# Patient Record
Sex: FEMALE | Race: WHITE | NOT HISPANIC OR LATINO | Employment: UNEMPLOYED | URBAN - METROPOLITAN AREA
[De-identification: names, ages, dates, MRNs, and addresses within clinical notes are randomized per-mention and may not be internally consistent; named-entity substitution may affect disease eponyms.]

---

## 2017-12-05 PROBLEM — K37 APPENDICITIS: Status: ACTIVE | Noted: 2017-12-05

## 2018-11-21 PROBLEM — M48.02 SPINAL STENOSIS IN CERVICAL REGION: Status: ACTIVE | Noted: 2018-11-21

## 2020-09-21 PROBLEM — K21.9 GASTROESOPHAGEAL REFLUX DISEASE: Status: ACTIVE | Noted: 2020-09-21

## 2021-02-03 ENCOUNTER — OFFICE VISIT (OUTPATIENT)
Dept: PSYCHIATRY | Facility: CLINIC | Age: 41
End: 2021-02-03
Payer: COMMERCIAL

## 2021-02-03 VITALS
SYSTOLIC BLOOD PRESSURE: 125 MMHG | HEIGHT: 62 IN | WEIGHT: 162.94 LBS | TEMPERATURE: 98 F | RESPIRATION RATE: 17 BRPM | DIASTOLIC BLOOD PRESSURE: 74 MMHG | BODY MASS INDEX: 29.98 KG/M2 | HEART RATE: 75 BPM

## 2021-02-03 DIAGNOSIS — F41.1 GAD (GENERALIZED ANXIETY DISORDER): ICD-10-CM

## 2021-02-03 DIAGNOSIS — F99 INSOMNIA DUE TO OTHER MENTAL DISORDER: ICD-10-CM

## 2021-02-03 DIAGNOSIS — Z65.8 PSYCHOSOCIAL STRESSORS: ICD-10-CM

## 2021-02-03 DIAGNOSIS — F43.10 PTSD (POST-TRAUMATIC STRESS DISORDER): ICD-10-CM

## 2021-02-03 DIAGNOSIS — F33.1 MODERATE EPISODE OF RECURRENT MAJOR DEPRESSIVE DISORDER: Primary | ICD-10-CM

## 2021-02-03 DIAGNOSIS — F51.05 INSOMNIA DUE TO OTHER MENTAL DISORDER: ICD-10-CM

## 2021-02-03 PROCEDURE — 3008F PR BODY MASS INDEX (BMI) DOCUMENTED: ICD-10-PCS | Mod: CPTII,S$GLB,, | Performed by: STUDENT IN AN ORGANIZED HEALTH CARE EDUCATION/TRAINING PROGRAM

## 2021-02-03 PROCEDURE — 90792 PR PSYCHIATRIC DIAGNOSTIC EVALUATION W/MEDICAL SERVICES: ICD-10-PCS | Mod: S$GLB,,, | Performed by: STUDENT IN AN ORGANIZED HEALTH CARE EDUCATION/TRAINING PROGRAM

## 2021-02-03 PROCEDURE — 99999 PR PBB SHADOW E&M-EST. PATIENT-LVL III: CPT | Mod: PBBFAC,,, | Performed by: STUDENT IN AN ORGANIZED HEALTH CARE EDUCATION/TRAINING PROGRAM

## 2021-02-03 PROCEDURE — 1126F AMNT PAIN NOTED NONE PRSNT: CPT | Mod: S$GLB,,, | Performed by: STUDENT IN AN ORGANIZED HEALTH CARE EDUCATION/TRAINING PROGRAM

## 2021-02-03 PROCEDURE — 99999 PR PBB SHADOW E&M-EST. PATIENT-LVL III: ICD-10-PCS | Mod: PBBFAC,,, | Performed by: STUDENT IN AN ORGANIZED HEALTH CARE EDUCATION/TRAINING PROGRAM

## 2021-02-03 PROCEDURE — 90792 PSYCH DIAG EVAL W/MED SRVCS: CPT | Mod: S$GLB,,, | Performed by: STUDENT IN AN ORGANIZED HEALTH CARE EDUCATION/TRAINING PROGRAM

## 2021-02-03 PROCEDURE — 3008F BODY MASS INDEX DOCD: CPT | Mod: CPTII,S$GLB,, | Performed by: STUDENT IN AN ORGANIZED HEALTH CARE EDUCATION/TRAINING PROGRAM

## 2021-02-03 PROCEDURE — 1126F PR PAIN SEVERITY QUANTIFIED, NO PAIN PRESENT: ICD-10-PCS | Mod: S$GLB,,, | Performed by: STUDENT IN AN ORGANIZED HEALTH CARE EDUCATION/TRAINING PROGRAM

## 2021-02-03 RX ORDER — HYDROXYZINE PAMOATE 50 MG/1
CAPSULE ORAL
Qty: 60 CAPSULE | Refills: 2 | Status: SHIPPED | OUTPATIENT
Start: 2021-02-03 | End: 2021-04-27

## 2021-02-03 RX ORDER — BUPROPION HYDROCHLORIDE 150 MG/1
150 TABLET ORAL DAILY
Qty: 30 TABLET | Refills: 2 | Status: SHIPPED | OUTPATIENT
Start: 2021-02-03 | End: 2021-04-27

## 2021-02-03 RX ORDER — VENLAFAXINE HYDROCHLORIDE 75 MG/1
75 CAPSULE, EXTENDED RELEASE ORAL DAILY
Qty: 30 CAPSULE | Refills: 2 | Status: SHIPPED | OUTPATIENT
Start: 2021-02-03 | End: 2021-04-27

## 2021-05-06 ENCOUNTER — PATIENT MESSAGE (OUTPATIENT)
Dept: RESEARCH | Facility: HOSPITAL | Age: 41
End: 2021-05-06

## 2021-05-10 ENCOUNTER — PATIENT MESSAGE (OUTPATIENT)
Dept: RESEARCH | Facility: HOSPITAL | Age: 41
End: 2021-05-10

## 2022-06-06 ENCOUNTER — OFFICE VISIT (OUTPATIENT)
Dept: SURGERY | Facility: CLINIC | Age: 42
End: 2022-06-06
Payer: COMMERCIAL

## 2022-06-06 VITALS
BODY MASS INDEX: 31.24 KG/M2 | WEIGHT: 169.75 LBS | HEART RATE: 72 BPM | DIASTOLIC BLOOD PRESSURE: 70 MMHG | SYSTOLIC BLOOD PRESSURE: 122 MMHG | HEIGHT: 62 IN

## 2022-06-06 DIAGNOSIS — K57.32 DIVERTICULITIS OF LARGE INTESTINE WITHOUT PERFORATION OR ABSCESS WITHOUT BLEEDING: Primary | ICD-10-CM

## 2022-06-06 DIAGNOSIS — Z01.818 PRE-OP TESTING: ICD-10-CM

## 2022-06-06 PROCEDURE — 3008F BODY MASS INDEX DOCD: CPT | Mod: CPTII,S$GLB,, | Performed by: SURGERY

## 2022-06-06 PROCEDURE — 99205 PR OFFICE/OUTPT VISIT, NEW, LEVL V, 60-74 MIN: ICD-10-PCS | Mod: S$GLB,,, | Performed by: SURGERY

## 2022-06-06 PROCEDURE — 99999 PR PBB SHADOW E&M-EST. PATIENT-LVL IV: ICD-10-PCS | Mod: PBBFAC,,, | Performed by: SURGERY

## 2022-06-06 PROCEDURE — 3074F PR MOST RECENT SYSTOLIC BLOOD PRESSURE < 130 MM HG: ICD-10-PCS | Mod: CPTII,S$GLB,, | Performed by: SURGERY

## 2022-06-06 PROCEDURE — 99205 OFFICE O/P NEW HI 60 MIN: CPT | Mod: S$GLB,,, | Performed by: SURGERY

## 2022-06-06 PROCEDURE — 3078F DIAST BP <80 MM HG: CPT | Mod: CPTII,S$GLB,, | Performed by: SURGERY

## 2022-06-06 PROCEDURE — 99999 PR PBB SHADOW E&M-EST. PATIENT-LVL IV: CPT | Mod: PBBFAC,,, | Performed by: SURGERY

## 2022-06-06 PROCEDURE — 3078F PR MOST RECENT DIASTOLIC BLOOD PRESSURE < 80 MM HG: ICD-10-PCS | Mod: CPTII,S$GLB,, | Performed by: SURGERY

## 2022-06-06 PROCEDURE — 1159F PR MEDICATION LIST DOCUMENTED IN MEDICAL RECORD: ICD-10-PCS | Mod: CPTII,S$GLB,, | Performed by: SURGERY

## 2022-06-06 PROCEDURE — 1159F MED LIST DOCD IN RCRD: CPT | Mod: CPTII,S$GLB,, | Performed by: SURGERY

## 2022-06-06 PROCEDURE — 3008F PR BODY MASS INDEX (BMI) DOCUMENTED: ICD-10-PCS | Mod: CPTII,S$GLB,, | Performed by: SURGERY

## 2022-06-06 PROCEDURE — 3074F SYST BP LT 130 MM HG: CPT | Mod: CPTII,S$GLB,, | Performed by: SURGERY

## 2022-06-06 RX ORDER — METRONIDAZOLE 500 MG/1
TABLET ORAL
Qty: 3 TABLET | Refills: 0 | Status: SHIPPED | OUTPATIENT
Start: 2022-06-06 | End: 2022-06-23

## 2022-06-06 RX ORDER — NEOMYCIN SULFATE 500 MG/1
TABLET ORAL
Qty: 6 TABLET | Refills: 0 | Status: SHIPPED | OUTPATIENT
Start: 2022-06-06 | End: 2022-06-23

## 2022-06-07 RX ORDER — ACETAMINOPHEN 500 MG
1000 TABLET ORAL
Status: CANCELLED | OUTPATIENT
Start: 2022-06-07 | End: 2022-06-07

## 2022-06-07 RX ORDER — SCOLOPAMINE TRANSDERMAL SYSTEM 1 MG/1
1 PATCH, EXTENDED RELEASE TRANSDERMAL
Status: CANCELLED | OUTPATIENT
Start: 2022-06-07

## 2022-06-07 NOTE — H&P (VIEW-ONLY)
CRS Office Visit History and Physical    Referring Md:   Aaareferral Self  No address on file    SUBJECTIVE:     Chief Complaint: left lower quadrant pain    History of Present Illness:  The patient is a new patient to this practice.   Course is as follows:  Claudia Shaw is a 42 y.o. female presents with recurrent left lower quadrant pain.  She has been seen in the ED in [place] numerous times with repeat imaging, which has showed various etiologies including mesenteric adenitis and enteritis.  She has documented diverticulosis, but no diverticulitis.  However, she has been treated numerous times for diverticulitis with oral antibiotics and has improvement in her symptoms with treatment.  She reports left abdominal pain.  She also has nausea, vomiting and diarrhea.  She has had a colonoscopy within the last 5 years with removal of benign polyps.  Her mother and aunt have both had sigmoid colectomies for diverticular disease and her sister has diverticular disease as well.     Last Colonoscopy: obtaining records    Review of patient's allergies indicates:   Allergen Reactions    Dilaudid [hydromorphone (bulk)] Shortness Of Breath    Omnicef [cefdinir] Rash       Past Medical History:   Diagnosis Date    Arthritis     Asthma     Bronchitis     Diverticulitis     Diverticulitis     Frozen shoulder     left    Kidney stones     PONV (postoperative nausea and vomiting)     Spinal stenosis     Thyroid disease      Past Surgical History:   Procedure Laterality Date    ANTERIOR CERVICAL DISCECTOMY W/ FUSION N/A 11/21/2018    Procedure: DISCECTOMY, SPINE, CERVICAL, ANTERIOR APPROACH, WITH FUSION, C4-5 C5-6 W/ AUTOGRAFT FUSION/CAGE/PLATE;  Surgeon: Michele Chatterjee MD;  Location: [facility];  Service: Neurosurgery;  Laterality: N/A;    APPENDECTOMY      CHOLECYSTECTOMY      HYSTERECTOMY      TUBAL LIGATION      TUBAL LIGATION       Family History   Problem Relation Age of Onset    Diverticulitis  "Mother     Heart disease Father     No Known Problems Sister     No Known Problems Brother     No Known Problems Daughter     No Known Problems Sister     No Known Problems Sister     No Known Problems Sister     No Known Problems Brother     No Known Problems Brother     No Known Problems Daughter     No Known Problems Daughter     No Known Problems Daughter      Social History     Tobacco Use    Smoking status: Never Smoker    Smokeless tobacco: Never Used   Substance Use Topics    Alcohol use: Yes     Alcohol/week: 0.0 standard drinks     Comment: occ    Drug use: No        Review of Systems:  Review of Systems   Constitutional: Negative.    HENT: Negative.    Eyes: Negative.    Respiratory: Negative.    Cardiovascular: Negative.    Gastrointestinal:        See HPI   Genitourinary: Negative.    Musculoskeletal: Positive for back pain.   Skin: Negative.    Neurological: Negative.    Psychiatric/Behavioral: Negative.        OBJECTIVE:     Vital Signs (Most Recent)  /70 (BP Location: Left arm, Patient Position: Sitting, BP Method: Large (Automatic))   Pulse 72   Ht 5' 2" (1.575 m)   Wt 77 kg (169 lb 12.1 oz)   BMI 31.05 kg/m²     Physical Exam:  General: 42 y.o. female in no distress   Neuro: alert and oriented x 4.  Moves all extremities.     HEENT: normocephalic, atraumatic, PERRL, EOMI   Respiratory: respirations are even and unlabored  Cardiac: regular rate and rhythm  Abdomen: soft, NTND  Extremities: Warm dry and intact  Skin: no rashes      Labs:     Component Ref Range & Units 1 mo ago   (5/1/22) 4 mo ago   (1/18/22) 8 mo ago   (10/2/21) 10 mo ago   (7/24/21) 1 yr ago   (3/28/21) 1 yr ago   (3/13/21) 1 yr ago   (2/8/21)   WBC 3.90 - 12.70 K/uL 6.70  7.40  7.80  6.30  4.80  11.70  7.90    RBC 4.00 - 5.40 M/uL 4.49  4.55  4.90  4.83  4.86  4.52  4.77    Hemoglobin 12.0 - 16.0 g/dL 13.5  13.7  14.4  14.1  14.5  13.4  14.4    Hematocrit 37.0 - 48.5 % 40.2  40.5  43.3  43.0  43.0  39.9  " 42.5    MCV 82 - 98 fL 89  89  88  89  89  88  89    MCH 27.0 - 31.0 pg 30.0  30.1  29.3  29.2  29.9  29.7  30.2    MCHC 32.0 - 36.0 g/dL 33.5  33.9  33.2  32.8  33.7  33.7  33.9    RDW 11.5 - 14.5 % 13.2  13.8  13.7  13.6  13.8  13.8  13.5    Platelets 150 - 450 K/uL 257  239  268            Imaging:   Multiple CT scans personally reviewed.       ASSESSMENT/PLAN:     Claudia was seen today for diverticulitis.    Diagnoses and all orders for this visit:    Diverticulitis of large intestine without perforation or abscess without bleeding  -     Case Request Operating Room: XI ROBOTIC SIGMOID COLECTOMY, flexible sigmoidoscopy    Other orders  -     neomycin (MYCIFRADIN) 500 mg Tab; Take 2 tabs 1400 (2pm) 1500 (3 pm) 2300 (11 pm)  -     metroNIDAZOLE (FLAGYL) 500 MG tablet; 1400 (2pm) 1500  (3 pm) 2300 (11pm)        42 y.o. female with recurrent LLQ pain    - Ms. Shaw's labs and imaging personally reviewed.  The patient has recurrent LLQ pain which improves with empiric treatment for diverticulitis, although she does not have documented diverticulitis on CT.  She does have diverticulosis.  Her symptoms are nausea, vomiting, diarrhea and LLQ pain.  She has had a [facility] BSO.  Her mother and aunt have both had sigmoid colectomies for diverticular disease and her sister also has diverticulitis.  She has had a recent colonoscopy, we will obtain the records.   - We discussed the possibility that she has smoldering, recurrent diverticulitis.  However, some of her symptoms are certainly atypical for diverticular disease and are more consistent with GERD or PUD.  We discussed that she may benefit from sigmoid colectomy, however, she may not have improvement of her symptoms since she has never had image proven diverticulitis.  We also discussed that sigmoid colectomy is a major abdominal surgery that has significant risks including risk of anastomotic leak, bleeding, infection, damage to local structures including the ureter and  need for possible ostomy.  She is an acceptable surgical candidate, she does not smoke, is not diabetic and does not have significant medical comorbidities.  We discussed the potential options and the patient wishes to proceed with sigmoid colectomy.  We will obtain her endoscopy reports prior to proceeding with surgical intervention.   - consent obtained. Tentatively scheduled for 6/28.     Trinidad Becerra MD  Staff Surgeon  Colon & Rectal Surgery

## 2022-06-07 NOTE — PROGRESS NOTES
CRS Office Visit History and Physical    Referring Md:   Aaareferral Self  No address on file    SUBJECTIVE:     Chief Complaint: left lower quadrant pain    History of Present Illness:  The patient is a new patient to this practice.   Course is as follows:  Claudia Shaw is a 42 y.o. female presents with recurrent left lower quadrant pain.  She has been seen in the ED in [place] numerous times with repeat imaging, which has showed various etiologies including mesenteric adenitis and enteritis.  She has documented diverticulosis, but no diverticulitis.  However, she has been treated numerous times for diverticulitis with oral antibiotics and has improvement in her symptoms with treatment.  She reports left abdominal pain.  She also has nausea, vomiting and diarrhea.  She has had a colonoscopy within the last 5 years with removal of benign polyps.  Her mother and aunt have both had sigmoid colectomies for diverticular disease and her sister has diverticular disease as well.     Last Colonoscopy: obtaining records    Review of patient's allergies indicates:   Allergen Reactions    Dilaudid [hydromorphone (bulk)] Shortness Of Breath    Omnicef [cefdinir] Rash       Past Medical History:   Diagnosis Date    Arthritis     Asthma     Bronchitis     Diverticulitis     Diverticulitis     Frozen shoulder     left    Kidney stones     PONV (postoperative nausea and vomiting)     Spinal stenosis     Thyroid disease      Past Surgical History:   Procedure Laterality Date    ANTERIOR CERVICAL DISCECTOMY W/ FUSION N/A 11/21/2018    Procedure: DISCECTOMY, SPINE, CERVICAL, ANTERIOR APPROACH, WITH FUSION, C4-5 C5-6 W/ AUTOGRAFT FUSION/CAGE/PLATE;  Surgeon: Michele Chatterjee MD;  Location: [facility];  Service: Neurosurgery;  Laterality: N/A;    APPENDECTOMY      CHOLECYSTECTOMY      HYSTERECTOMY      TUBAL LIGATION      TUBAL LIGATION       Family History   Problem Relation Age of Onset    Diverticulitis  "Mother     Heart disease Father     No Known Problems Sister     No Known Problems Brother     No Known Problems Daughter     No Known Problems Sister     No Known Problems Sister     No Known Problems Sister     No Known Problems Brother     No Known Problems Brother     No Known Problems Daughter     No Known Problems Daughter     No Known Problems Daughter      Social History     Tobacco Use    Smoking status: Never Smoker    Smokeless tobacco: Never Used   Substance Use Topics    Alcohol use: Yes     Alcohol/week: 0.0 standard drinks     Comment: occ    Drug use: No        Review of Systems:  Review of Systems   Constitutional: Negative.    HENT: Negative.    Eyes: Negative.    Respiratory: Negative.    Cardiovascular: Negative.    Gastrointestinal:        See HPI   Genitourinary: Negative.    Musculoskeletal: Positive for back pain.   Skin: Negative.    Neurological: Negative.    Psychiatric/Behavioral: Negative.        OBJECTIVE:     Vital Signs (Most Recent)  /70 (BP Location: Left arm, Patient Position: Sitting, BP Method: Large (Automatic))   Pulse 72   Ht 5' 2" (1.575 m)   Wt 77 kg (169 lb 12.1 oz)   BMI 31.05 kg/m²     Physical Exam:  General: 42 y.o. female in no distress   Neuro: alert and oriented x 4.  Moves all extremities.     HEENT: normocephalic, atraumatic, PERRL, EOMI   Respiratory: respirations are even and unlabored  Cardiac: regular rate and rhythm  Abdomen: soft, NTND  Extremities: Warm dry and intact  Skin: no rashes      Labs:     Component Ref Range & Units 1 mo ago   (5/1/22) 4 mo ago   (1/18/22) 8 mo ago   (10/2/21) 10 mo ago   (7/24/21) 1 yr ago   (3/28/21) 1 yr ago   (3/13/21) 1 yr ago   (2/8/21)   WBC 3.90 - 12.70 K/uL 6.70  7.40  7.80  6.30  4.80  11.70  7.90    RBC 4.00 - 5.40 M/uL 4.49  4.55  4.90  4.83  4.86  4.52  4.77    Hemoglobin 12.0 - 16.0 g/dL 13.5  13.7  14.4  14.1  14.5  13.4  14.4    Hematocrit 37.0 - 48.5 % 40.2  40.5  43.3  43.0  43.0  39.9  " 42.5    MCV 82 - 98 fL 89  89  88  89  89  88  89    MCH 27.0 - 31.0 pg 30.0  30.1  29.3  29.2  29.9  29.7  30.2    MCHC 32.0 - 36.0 g/dL 33.5  33.9  33.2  32.8  33.7  33.7  33.9    RDW 11.5 - 14.5 % 13.2  13.8  13.7  13.6  13.8  13.8  13.5    Platelets 150 - 450 K/uL 257  239  268            Imaging:   Multiple CT scans personally reviewed.       ASSESSMENT/PLAN:     Claudia was seen today for diverticulitis.    Diagnoses and all orders for this visit:    Diverticulitis of large intestine without perforation or abscess without bleeding  -     Case Request Operating Room: XI ROBOTIC SIGMOID COLECTOMY, flexible sigmoidoscopy    Other orders  -     neomycin (MYCIFRADIN) 500 mg Tab; Take 2 tabs 1400 (2pm) 1500 (3 pm) 2300 (11 pm)  -     metroNIDAZOLE (FLAGYL) 500 MG tablet; 1400 (2pm) 1500  (3 pm) 2300 (11pm)        42 y.o. female with recurrent LLQ pain    - Ms. Shaw's labs and imaging personally reviewed.  The patient has recurrent LLQ pain which improves with empiric treatment for diverticulitis, although she does not have documented diverticulitis on CT.  She does have diverticulosis.  Her symptoms are nausea, vomiting, diarrhea and LLQ pain.  She has had a [facility] BSO.  Her mother and aunt have both had sigmoid colectomies for diverticular disease and her sister also has diverticulitis.  She has had a recent colonoscopy, we will obtain the records.   - We discussed the possibility that she has smoldering, recurrent diverticulitis.  However, some of her symptoms are certainly atypical for diverticular disease and are more consistent with GERD or PUD.  We discussed that she may benefit from sigmoid colectomy, however, she may not have improvement of her symptoms since she has never had image proven diverticulitis.  We also discussed that sigmoid colectomy is a major abdominal surgery that has significant risks including risk of anastomotic leak, bleeding, infection, damage to local structures including the ureter and  need for possible ostomy.  She is an acceptable surgical candidate, she does not smoke, is not diabetic and does not have significant medical comorbidities.  We discussed the potential options and the patient wishes to proceed with sigmoid colectomy.  We will obtain her endoscopy reports prior to proceeding with surgical intervention.   - consent obtained. Tentatively scheduled for 6/28.     Trinidad Becerra MD  Staff Surgeon  Colon & Rectal Surgery

## 2022-06-09 ENCOUNTER — TELEPHONE (OUTPATIENT)
Dept: SURGERY | Facility: CLINIC | Age: 42
End: 2022-06-09
Payer: COMMERCIAL

## 2022-06-09 NOTE — TELEPHONE ENCOUNTER
"Spoke with patient to inquire about location of most recent colonoscopy with Dr. Rubens Ashley. Pt stated, "it was done in [place], [place] at a small clinic that may be affliated with [facility]."  "

## 2022-06-14 ENCOUNTER — TELEPHONE (OUTPATIENT)
Dept: SURGERY | Facility: CLINIC | Age: 42
End: 2022-06-14
Payer: COMMERCIAL

## 2022-06-14 NOTE — TELEPHONE ENCOUNTER
"Spoke with patient regarding procedure length and time expected in the hospital PO. Pt stated, "she would like to have someone with her during the stay since she is from out of town." Informed that someone will be able to stay while she is admitted. Inquired about location of recent colonoscopy done by Dr. Rubens Ashley. Pt stated, "it was done on [facility] But will reach out to MD office to find out exactly." Inquired about Covid testing site near home for screening before procedure. Pt stated, "she wants to get the covid vaccine since she is going on a cruise January '23. She will bring vaccination card to pre admit appointment if she does."       ----- Message from Antwon Tucker sent at 6/14/2022  9:36 AM CDT -----  Contact: Patient  Patient requesting call back in regards to scheduled procedure. Patient stated that she would like to know how long is the stay  and can someone stay with her due to traveling.       Patient @[phone] (home)         "

## 2022-06-23 ENCOUNTER — ANESTHESIA EVENT (OUTPATIENT)
Dept: SURGERY | Facility: OTHER | Age: 42
DRG: 330 | End: 2022-06-23
Payer: COMMERCIAL

## 2022-06-23 ENCOUNTER — HOSPITAL ENCOUNTER (OUTPATIENT)
Dept: PREADMISSION TESTING | Facility: OTHER | Age: 42
Discharge: HOME | End: 2022-06-23
Attending: SURGERY
Payer: COMMERCIAL

## 2022-06-23 VITALS
DIASTOLIC BLOOD PRESSURE: 74 MMHG | HEART RATE: 77 BPM | BODY MASS INDEX: 31 KG/M2 | TEMPERATURE: 98 F | OXYGEN SATURATION: 96 % | HEIGHT: 62 IN | SYSTOLIC BLOOD PRESSURE: 120 MMHG | WEIGHT: 168.44 LBS

## 2022-06-23 LAB
ABO + RH BLD: NORMAL
BLD GP AB SCN CELLS X3 SERPL QL: NORMAL

## 2022-06-23 PROCEDURE — 36415 COLL VENOUS BLD VENIPUNCTURE: CPT | Performed by: NURSE PRACTITIONER

## 2022-06-23 PROCEDURE — 86850 RBC ANTIBODY SCREEN: CPT | Performed by: NURSE PRACTITIONER

## 2022-06-23 RX ORDER — LIDOCAINE HYDROCHLORIDE 10 MG/ML
0.5 INJECTION, SOLUTION EPIDURAL; INFILTRATION; INTRACAUDAL; PERINEURAL ONCE
Status: CANCELLED | OUTPATIENT
Start: 2022-06-23 | End: 2022-06-23

## 2022-06-23 RX ORDER — PREGABALIN 50 MG/1
50 CAPSULE ORAL
Status: CANCELLED | OUTPATIENT
Start: 2022-06-23 | End: 2022-06-23

## 2022-06-23 RX ORDER — SCOLOPAMINE TRANSDERMAL SYSTEM 1 MG/1
1 PATCH, EXTENDED RELEASE TRANSDERMAL ONCE
Status: CANCELLED | OUTPATIENT
Start: 2022-06-23 | End: 2022-06-23

## 2022-06-23 RX ORDER — METRONIDAZOLE 500 MG/1
TABLET ORAL
Qty: 3 TABLET | Refills: 0 | Status: ON HOLD | OUTPATIENT
Start: 2022-06-23 | End: 2022-07-01 | Stop reason: HOSPADM

## 2022-06-23 RX ORDER — ACETAMINOPHEN 500 MG
1000 TABLET ORAL
Status: CANCELLED | OUTPATIENT
Start: 2022-06-23 | End: 2022-06-23

## 2022-06-23 RX ORDER — NEOMYCIN SULFATE 500 MG/1
TABLET ORAL
Qty: 6 TABLET | Refills: 0 | Status: ON HOLD | OUTPATIENT
Start: 2022-06-23 | End: 2022-07-01 | Stop reason: HOSPADM

## 2022-06-23 RX ORDER — SODIUM CHLORIDE, SODIUM LACTATE, POTASSIUM CHLORIDE, CALCIUM CHLORIDE 600; 310; 30; 20 MG/100ML; MG/100ML; MG/100ML; MG/100ML
INJECTION, SOLUTION INTRAVENOUS CONTINUOUS
Status: CANCELLED | OUTPATIENT
Start: 2022-06-23

## 2022-06-23 RX ORDER — MULTIVITAMIN
1 TABLET ORAL DAILY
COMMUNITY

## 2022-06-23 NOTE — PRE ADMISSION SCREENING
Patient stated she has not been vaccinated,instructed to take a covid test before prep and day of surgery,patient verbalized understanding.message left for patient to take two antibiotics as ordered pre-op.,verbalized understanding.

## 2022-06-23 NOTE — ANESTHESIA PREPROCEDURE EVALUATION
06/23/2022  Yaritza Shaw is a 42 y.o., female.      Pre-op Assessment    I have reviewed the Patient Summary Reports.     I have reviewed the Nursing Notes. I have reviewed the NPO Status.   I have reviewed the Medications.     Review of Systems  Anesthesia Hx:  PONV History of prior surgery of interest to airway management or planning: cervical fusion. Previous anesthesia: General Recent ACF with general anesthesia.  Denies Family Hx of Anesthesia complications.  Personal Hx of Anesthesia complications, Post-Operative Nausea/Vomiting   Social:  Non-Smoker, Social Alcohol Use    Hematology/Oncology:  Hematology Normal   Oncology Normal     EENT/Dental:EENT/Dental Normal   Cardiovascular:   Exercise tolerance: good    Pulmonary:   Asthma asymptomatic    Renal/:   Chronic Renal Disease renal calculi    Hepatic/GI:   GERD, well controlled Diverticulitis   Musculoskeletal:  Spine Disorders: cervical Disc disease and Degenerative disease    Neurological:  Neurology Normal    Endocrine:  Endocrine Normal    Psych:   Psychiatric History anxiety PTSD         Physical Exam  General: Cooperative, Alert and Oriented    Airway:  Mallampati: II   Mouth Opening: Normal  Neck ROM: Extension Decreased, Left Lateral Motion Decreased    Dental:  Intact        Anesthesia Plan  Type of Anesthesia, risks & benefits discussed:    Anesthesia Type: Gen ETT  Intra-op Monitoring Plan: Standard ASA Monitors  Post Op Pain Control Plan: multimodal analgesia and peripheral nerve block  Induction:  IV  Airway Plan: Video, Post-Induction  Informed Consent: Informed consent signed with the Patient and all parties understand the risks and agree with anesthesia plan.  All questions answered.   ASA Score: 2  Anesthesia Plan Notes: Block discussed,labs ok in epic,PONV  Care w neck positioning,recent ACF    Ready For Surgery From  Anesthesia Perspective.     .

## 2022-06-23 NOTE — DISCHARGE INSTRUCTIONS
Information to Prepare you for your Surgery    PRE-ADMIT TESTING -  [phone]    [address] [facility]          Your surgery has been scheduled at [facility]. We are pleased to have the opportunity to serve you. For Further Information please call [phone].    On the day of surgery please report to the Information Desk on the 1st floor.    CONTACT YOUR PHYSICIAN'S OFFICE THE DAY PRIOR TO YOUR SURGERY TO OBTAIN YOUR ARRIVAL TIME.     The evening before surgery do not eat anything after 9 p.m. ( this includes hard candy, chewing gum and mints).  You may only have GATORADE, POWERADE AND WATER  from 9 p.m. until you leave your home.   DO NOT DRINK ANY LIQUIDS ON THE WAY TO THE HOSPITAL.      Why does your anesthesiologist allow you to drink Gatorade/Powerade before surgery?  Gatorade/Powerade helps to increase your comfort before surgery and to decrease your nausea after surgery. The carbohydrates in Gatorade/Powerade help reduce your body's stress response to surgery.  If you are a diabetic-drink only water prior to surgery.      Current Visitor policy(12/27/2021) - Patients may have 2 visitors pre and post procedure. Only 2 visitors will be allowed in the Surgical building with the patient.     SPECIAL MEDICATION INSTRUCTIONS: TAKE medications checked off by the Anesthesiologist on your Medication List.    Angiogram Patients: Take medications as instructed by your physician, including aspirin.     Surgery Patients:    If you take ASPIRIN - Your PHYSICIAN/SURGEON will need to inform you IF/OR when you need to stop taking aspirin prior to your surgery.     Do Not take any medications containing IBUPROFEN.    Do Not Wear any make-up (especially eye make-up) to surgery. Please remove any false eyelashes or eyelash extensions. If you arrive the day of surgery with makeup/eyelashes on you will be required to remove prior to surgery. (There is a risk of corneal  abrasions if eye makeup/eyelash extensions are not removed)      Leave all valuables at home.   Do Not wear any jewelry or watches, including any metal in body piercings. Jewelry must be removed prior to coming to the hospital.  There is a possibility that rings that are unable to be removed may be cut off if they are on the surgical extremity.    Please remove all hair extensions, wigs, clips and any other metal accessories/ ornaments from your hair.  These items may pose a flammable/fire risk in Surgery and must be removed.    Do not shave your surgical area at least 5 days prior to your surgery. The surgical prep will be performed at the hospital according to Infection Control regulations.    Contact Lens must be removed before surgery. Either do not wear the contact lens or bring a case and solution for storage.  Please bring a container for eyeglasses or dentures as required.  Bring any paperwork your physician has provided, such as consent forms,  history and physicals, doctor's orders, etc.   Bring comfortable clothes that are loose fitting to wear upon discharge. Take into consideration the type of surgery being performed.  Maintain your diet as advised per your physician the day prior to surgery.      Adequate rest the night before surgery is advised.   Park in the Parking lot behind the hospital or in the [facility] across the street from the parking lot. Parking is complimentary.  If you will be discharged the same day as your procedure, please arrange for a responsible adult to drive you home or to accompany you if traveling by taxi.   YOU WILL NOT BE PERMITTED TO DRIVE OR TO LEAVE THE HOSPITAL ALONE AFTER SURGERY.   If you are being discharged the same day, it is strongly recommended that you arrange for someone to remain with you for the first 24 hrs following your surgery.    The Surgeon will speak to your family/visitor after your surgery regarding the outcome of your surgery and post op  care.  The Surgeon may speak to you after your surgery, but there is a possibility you may not remember the details.  Please check with your family members regarding the conversation with the Surgeon.    We strongly recommend whoever is bringing you home be present for discharge instructions.  This will ensure a thorough understanding for your post op home care.    ALL CHILDREN MUST ALWAYS BE ACCOMPANIED BY AN ADULT.    Visitors-Refer to current Visitor policy handouts.    Thank you for your cooperation.  The Staff of [facility].            Bathing Instructions with Hibiclens    Shower the evening before and morning of your procedure with Hibiclens:  Wash your face with water and your regular face wash/soap  Apply Hibiclens directly on your skin or on a wet washcloth and wash gently. When showering: Move away from the shower stream when applying Hibiclens to avoid rinsing off too soon.  Rinse thoroughly with warm water  Do not dilute Hibiclens        Dry off as usual, do not use any deodorant, powder, body lotions, perfume, after shave or cologne.

## 2022-06-27 ENCOUNTER — TELEPHONE (OUTPATIENT)
Dept: SURGERY | Facility: CLINIC | Age: 42
End: 2022-06-27
Payer: COMMERCIAL

## 2022-06-27 NOTE — TELEPHONE ENCOUNTER
Spoke with patient regarding nausea and vomiting. Advised to continue drinking fluids. Per Dr. Becerra, advised patient to continue prep with abx at 2300 and take sips as tolerated. Pt asked about insurance coverage for procedure because no one had returned her call. Given the number to financial clearance call center. Pt verbalized understanding.

## 2022-06-27 NOTE — TELEPHONE ENCOUNTER
"Spoke with patient regarding full prep for procedure tomorrow. Pt stated, "she tends to not be able to tolerate Flagyl and becomes nauseated and throws it up. Has not been able to hold down anything since." Denies intolerance to neomycin. Advised patient to continue drinking fluids to stay hydrated. Will update Dr. Becerra of pt status.       ----- Message from Jaycee Roberts sent at 6/27/2022  3:28 PM CDT -----  Contact: @ [phone]  Pt is calling she wants to know if there is something else she can do she has taken both medicines metroNIDAZOLE (FLAGYL) 500 MG tablet and neomycin (MYCIFRADIN) 500 mg Tab she is not able to keep anything down after she takes it please call and adv @ [phone]      "

## 2022-06-27 NOTE — TELEPHONE ENCOUNTER
Spoke with patient to inform of 0500 arrival time and location for procedure tomorrow. Pt asked if we had been in contact with her insurance. Informed that Radha was contacted and left message with medical records number for information requested. Attempted to contact [employer] again this morning, no answer. Will contact again this afternoon to see if any information is needed before patient's arrival tomorrow. Pt verbalized time and location.

## 2022-06-28 ENCOUNTER — HOSPITAL ENCOUNTER (INPATIENT)
Facility: OTHER | Age: 42
LOS: 7 days | Discharge: HOME | DRG: 330 | End: 2022-07-05
Attending: SURGERY | Admitting: SURGERY
Payer: COMMERCIAL

## 2022-06-28 ENCOUNTER — ANESTHESIA (OUTPATIENT)
Dept: SURGERY | Facility: OTHER | Age: 42
DRG: 330 | End: 2022-06-28
Payer: COMMERCIAL

## 2022-06-28 DIAGNOSIS — K57.32 DIVERTICULITIS OF LARGE INTESTINE WITHOUT PERFORATION OR ABSCESS WITHOUT BLEEDING: ICD-10-CM

## 2022-06-28 DIAGNOSIS — K57.32 DIVERTICULITIS LARGE INTESTINE: ICD-10-CM

## 2022-06-28 DIAGNOSIS — K57.92 DIVERTICULITIS: ICD-10-CM

## 2022-06-28 LAB
CTP QC/QA: YES
SARS-COV-2 AG RESP QL IA.RAPID: NEGATIVE

## 2022-06-28 PROCEDURE — 25000003 PHARM REV CODE 250: Performed by: NURSE PRACTITIONER

## 2022-06-28 PROCEDURE — 71000033 HC RECOVERY, INTIAL HOUR: Performed by: SURGERY

## 2022-06-28 PROCEDURE — 63600175 PHARM REV CODE 636 W HCPCS

## 2022-06-28 PROCEDURE — P9045 ALBUMIN (HUMAN), 5%, 250 ML: HCPCS | Mod: JG | Performed by: STUDENT IN AN ORGANIZED HEALTH CARE EDUCATION/TRAINING PROGRAM

## 2022-06-28 PROCEDURE — 64488 TAP BLOCK BI INJECTION: CPT | Performed by: ANESTHESIOLOGY

## 2022-06-28 PROCEDURE — 25000003 PHARM REV CODE 250: Performed by: SURGERY

## 2022-06-28 PROCEDURE — 36000713 HC OR TIME LEV V EA ADD 15 MIN: Performed by: SURGERY

## 2022-06-28 PROCEDURE — 63600175 PHARM REV CODE 636 W HCPCS: Performed by: NURSE PRACTITIONER

## 2022-06-28 PROCEDURE — 99900035 HC TECH TIME PER 15 MIN (STAT)

## 2022-06-28 PROCEDURE — 36000712 HC OR TIME LEV V 1ST 15 MIN: Performed by: SURGERY

## 2022-06-28 PROCEDURE — 63600175 PHARM REV CODE 636 W HCPCS: Performed by: ANESTHESIOLOGY

## 2022-06-28 PROCEDURE — 25000003 PHARM REV CODE 250: Performed by: SPECIALIST

## 2022-06-28 PROCEDURE — 63600175 PHARM REV CODE 636 W HCPCS: Performed by: SURGERY

## 2022-06-28 PROCEDURE — C9290 INJ, BUPIVACAINE LIPOSOME: HCPCS | Performed by: ANESTHESIOLOGY

## 2022-06-28 PROCEDURE — 94761 N-INVAS EAR/PLS OXIMETRY MLT: CPT

## 2022-06-28 PROCEDURE — 88307 PR  SURG PATH,LEVEL V: ICD-10-PCS | Mod: 26,,, | Performed by: STUDENT IN AN ORGANIZED HEALTH CARE EDUCATION/TRAINING PROGRAM

## 2022-06-28 PROCEDURE — 37000009 HC ANESTHESIA EA ADD 15 MINS: Performed by: SURGERY

## 2022-06-28 PROCEDURE — S0030 INJECTION, METRONIDAZOLE: HCPCS | Performed by: NURSE PRACTITIONER

## 2022-06-28 PROCEDURE — 25000003 PHARM REV CODE 250: Performed by: STUDENT IN AN ORGANIZED HEALTH CARE EDUCATION/TRAINING PROGRAM

## 2022-06-28 PROCEDURE — 88307 TISSUE EXAM BY PATHOLOGIST: CPT | Mod: 26,,, | Performed by: STUDENT IN AN ORGANIZED HEALTH CARE EDUCATION/TRAINING PROGRAM

## 2022-06-28 PROCEDURE — 11000001 HC ACUTE MED/SURG PRIVATE ROOM

## 2022-06-28 PROCEDURE — 44207 L COLECTOMY/COLOPROCTOSTOMY: CPT | Mod: ,,, | Performed by: SURGERY

## 2022-06-28 PROCEDURE — 25000003 PHARM REV CODE 250: Performed by: ANESTHESIOLOGY

## 2022-06-28 PROCEDURE — 44207 PR LAP,SURG,COLECTOMY,W/ANAST: ICD-10-PCS | Mod: ,,, | Performed by: SURGERY

## 2022-06-28 PROCEDURE — 88307 TISSUE EXAM BY PATHOLOGIST: CPT | Performed by: STUDENT IN AN ORGANIZED HEALTH CARE EDUCATION/TRAINING PROGRAM

## 2022-06-28 PROCEDURE — A4216 STERILE WATER/SALINE, 10 ML: HCPCS | Performed by: STUDENT IN AN ORGANIZED HEALTH CARE EDUCATION/TRAINING PROGRAM

## 2022-06-28 PROCEDURE — 37000008 HC ANESTHESIA 1ST 15 MINUTES: Performed by: SURGERY

## 2022-06-28 PROCEDURE — 63600175 PHARM REV CODE 636 W HCPCS: Performed by: STUDENT IN AN ORGANIZED HEALTH CARE EDUCATION/TRAINING PROGRAM

## 2022-06-28 PROCEDURE — 27201423 OPTIME MED/SURG SUP & DEVICES STERILE SUPPLY: Performed by: SURGERY

## 2022-06-28 PROCEDURE — 71000039 HC RECOVERY, EACH ADD'L HOUR: Performed by: SURGERY

## 2022-06-28 RX ORDER — DIPHENHYDRAMINE HYDROCHLORIDE 50 MG/ML
INJECTION INTRAMUSCULAR; INTRAVENOUS
Status: DISCONTINUED | OUTPATIENT
Start: 2022-06-28 | End: 2022-06-28

## 2022-06-28 RX ORDER — BUPIVACAINE HYDROCHLORIDE 2.5 MG/ML
INJECTION, SOLUTION EPIDURAL; INFILTRATION; INTRACAUDAL
Status: COMPLETED | OUTPATIENT
Start: 2022-06-28 | End: 2022-06-28

## 2022-06-28 RX ORDER — SCOLOPAMINE TRANSDERMAL SYSTEM 1 MG/1
1 PATCH, EXTENDED RELEASE TRANSDERMAL ONCE
Status: COMPLETED | OUTPATIENT
Start: 2022-06-28 | End: 2022-06-28

## 2022-06-28 RX ORDER — LIDOCAINE HYDROCHLORIDE 10 MG/ML
0.5 INJECTION, SOLUTION EPIDURAL; INFILTRATION; INTRACAUDAL; PERINEURAL ONCE
Status: DISCONTINUED | OUTPATIENT
Start: 2022-06-28 | End: 2022-06-28 | Stop reason: HOSPADM

## 2022-06-28 RX ORDER — IBUPROFEN 600 MG/1
600 TABLET ORAL
Status: DISCONTINUED | OUTPATIENT
Start: 2022-06-28 | End: 2022-06-28

## 2022-06-28 RX ORDER — HEPARIN SODIUM 5000 [USP'U]/ML
5000 INJECTION, SOLUTION INTRAVENOUS; SUBCUTANEOUS ONCE
Status: DISPENSED | OUTPATIENT
Start: 2022-06-28

## 2022-06-28 RX ORDER — MIDAZOLAM HYDROCHLORIDE 1 MG/ML
INJECTION INTRAMUSCULAR; INTRAVENOUS
Status: DISCONTINUED | OUTPATIENT
Start: 2022-06-28 | End: 2022-06-28

## 2022-06-28 RX ORDER — METRONIDAZOLE 500 MG/100ML
500 INJECTION, SOLUTION INTRAVENOUS
Status: COMPLETED | OUTPATIENT
Start: 2022-06-28 | End: 2022-06-28

## 2022-06-28 RX ORDER — SODIUM CHLORIDE 0.9 % (FLUSH) 0.9 %
3 SYRINGE (ML) INJECTION
Status: DISCONTINUED | OUTPATIENT
Start: 2022-06-28 | End: 2022-07-05 | Stop reason: HOSPADM

## 2022-06-28 RX ORDER — PROPOFOL 10 MG/ML
VIAL (ML) INTRAVENOUS
Status: DISCONTINUED | OUTPATIENT
Start: 2022-06-28 | End: 2022-06-28

## 2022-06-28 RX ORDER — GABAPENTIN 300 MG/1
300 CAPSULE ORAL 3 TIMES DAILY
Status: DISCONTINUED | OUTPATIENT
Start: 2022-06-28 | End: 2022-07-05 | Stop reason: HOSPADM

## 2022-06-28 RX ORDER — ALBUTEROL SULFATE 90 UG/1
2 AEROSOL, METERED RESPIRATORY (INHALATION) 3 TIMES DAILY PRN
Status: DISCONTINUED | OUTPATIENT
Start: 2022-06-28 | End: 2022-07-05 | Stop reason: HOSPADM

## 2022-06-28 RX ORDER — ONDANSETRON 2 MG/ML
4 INJECTION INTRAMUSCULAR; INTRAVENOUS EVERY 8 HOURS PRN
Status: DISCONTINUED | OUTPATIENT
Start: 2022-06-28 | End: 2022-07-05 | Stop reason: HOSPADM

## 2022-06-28 RX ORDER — SODIUM CHLORIDE 9 MG/ML
INJECTION, SOLUTION INTRAVENOUS CONTINUOUS
Status: DISCONTINUED | OUTPATIENT
Start: 2022-06-28 | End: 2022-06-29

## 2022-06-28 RX ORDER — MUPIROCIN 20 MG/G
OINTMENT TOPICAL 2 TIMES DAILY
Status: COMPLETED | OUTPATIENT
Start: 2022-06-28 | End: 2022-07-02

## 2022-06-28 RX ORDER — VENLAFAXINE HYDROCHLORIDE 75 MG/1
75 CAPSULE, EXTENDED RELEASE ORAL DAILY
Status: DISCONTINUED | OUTPATIENT
Start: 2022-06-29 | End: 2022-07-05 | Stop reason: HOSPADM

## 2022-06-28 RX ORDER — OXYCODONE HYDROCHLORIDE 5 MG/1
5 TABLET ORAL EVERY 4 HOURS PRN
Status: DISCONTINUED | OUTPATIENT
Start: 2022-06-28 | End: 2022-07-05 | Stop reason: HOSPADM

## 2022-06-28 RX ORDER — PROCHLORPERAZINE EDISYLATE 5 MG/ML
5 INJECTION INTRAMUSCULAR; INTRAVENOUS EVERY 30 MIN PRN
Status: DISCONTINUED | OUTPATIENT
Start: 2022-06-28 | End: 2022-06-28 | Stop reason: HOSPADM

## 2022-06-28 RX ORDER — ONDANSETRON HYDROCHLORIDE 2 MG/ML
INJECTION, SOLUTION INTRAMUSCULAR; INTRAVENOUS
Status: DISCONTINUED | OUTPATIENT
Start: 2022-06-28 | End: 2022-06-28

## 2022-06-28 RX ORDER — ROCURONIUM BROMIDE 10 MG/ML
INJECTION, SOLUTION INTRAVENOUS
Status: DISCONTINUED | OUTPATIENT
Start: 2022-06-28 | End: 2022-06-28

## 2022-06-28 RX ORDER — IBUPROFEN 400 MG/1
800 TABLET ORAL EVERY 8 HOURS
Status: DISCONTINUED | OUTPATIENT
Start: 2022-06-29 | End: 2022-07-05 | Stop reason: HOSPADM

## 2022-06-28 RX ORDER — DIPHENHYDRAMINE HYDROCHLORIDE 50 MG/ML
25 INJECTION INTRAMUSCULAR; INTRAVENOUS EVERY 6 HOURS PRN
Status: DISCONTINUED | OUTPATIENT
Start: 2022-06-28 | End: 2022-06-28 | Stop reason: HOSPADM

## 2022-06-28 RX ORDER — LORAZEPAM 0.5 MG/1
0.5 TABLET ORAL ONCE
Status: COMPLETED | OUTPATIENT
Start: 2022-06-28 | End: 2022-06-28

## 2022-06-28 RX ORDER — PROPOFOL 10 MG/ML
VIAL (ML) INTRAVENOUS CONTINUOUS PRN
Status: DISCONTINUED | OUTPATIENT
Start: 2022-06-28 | End: 2022-06-28

## 2022-06-28 RX ORDER — ACETAMINOPHEN 500 MG
1000 TABLET ORAL EVERY 8 HOURS
Status: DISCONTINUED | OUTPATIENT
Start: 2022-06-29 | End: 2022-07-05 | Stop reason: HOSPADM

## 2022-06-28 RX ORDER — BUPROPION HYDROCHLORIDE 150 MG/1
150 TABLET ORAL DAILY
Status: DISCONTINUED | OUTPATIENT
Start: 2022-06-29 | End: 2022-07-05 | Stop reason: HOSPADM

## 2022-06-28 RX ORDER — PREGABALIN 50 MG/1
50 CAPSULE ORAL
Status: COMPLETED | OUTPATIENT
Start: 2022-06-28 | End: 2022-06-28

## 2022-06-28 RX ORDER — ACETAMINOPHEN 10 MG/ML
1000 INJECTION, SOLUTION INTRAVENOUS EVERY 8 HOURS
Status: COMPLETED | OUTPATIENT
Start: 2022-06-28 | End: 2022-06-29

## 2022-06-28 RX ORDER — MUPIROCIN 20 MG/G
OINTMENT TOPICAL
Status: DISPENSED | OUTPATIENT
Start: 2022-06-28

## 2022-06-28 RX ORDER — KETAMINE HCL IN 0.9 % NACL 50 MG/5 ML
SYRINGE (ML) INTRAVENOUS
Status: DISCONTINUED | OUTPATIENT
Start: 2022-06-28 | End: 2022-06-28

## 2022-06-28 RX ORDER — DEXAMETHASONE SODIUM PHOSPHATE 4 MG/ML
INJECTION, SOLUTION INTRA-ARTICULAR; INTRALESIONAL; INTRAMUSCULAR; INTRAVENOUS; SOFT TISSUE
Status: DISCONTINUED | OUTPATIENT
Start: 2022-06-28 | End: 2022-06-28

## 2022-06-28 RX ORDER — MORPHINE SULFATE 10 MG/ML
2 INJECTION INTRAMUSCULAR; INTRAVENOUS; SUBCUTANEOUS EVERY 4 HOURS PRN
Status: DISCONTINUED | OUTPATIENT
Start: 2022-06-28 | End: 2022-06-29 | Stop reason: CLARIF

## 2022-06-28 RX ORDER — LIDOCAINE HYDROCHLORIDE 20 MG/ML
INJECTION, SOLUTION EPIDURAL; INFILTRATION; INTRACAUDAL; PERINEURAL
Status: DISCONTINUED | OUTPATIENT
Start: 2022-06-28 | End: 2022-06-28

## 2022-06-28 RX ORDER — FENTANYL CITRATE 50 UG/ML
INJECTION, SOLUTION INTRAMUSCULAR; INTRAVENOUS
Status: DISCONTINUED | OUTPATIENT
Start: 2022-06-28 | End: 2022-06-28

## 2022-06-28 RX ORDER — SODIUM CHLORIDE 0.9 % (FLUSH) 0.9 %
10 SYRINGE (ML) INJECTION
Status: DISCONTINUED | OUTPATIENT
Start: 2022-06-28 | End: 2022-07-05 | Stop reason: HOSPADM

## 2022-06-28 RX ORDER — SODIUM CHLORIDE, SODIUM LACTATE, POTASSIUM CHLORIDE, CALCIUM CHLORIDE 600; 310; 30; 20 MG/100ML; MG/100ML; MG/100ML; MG/100ML
INJECTION, SOLUTION INTRAVENOUS CONTINUOUS
Status: DISCONTINUED | OUTPATIENT
Start: 2022-06-28 | End: 2022-06-29

## 2022-06-28 RX ORDER — KETOROLAC TROMETHAMINE 30 MG/ML
INJECTION, SOLUTION INTRAMUSCULAR; INTRAVENOUS
Status: DISCONTINUED | OUTPATIENT
Start: 2022-06-28 | End: 2022-06-28

## 2022-06-28 RX ORDER — LIDOCAINE HYDROCHLORIDE 10 MG/ML
1 INJECTION, SOLUTION EPIDURAL; INFILTRATION; INTRACAUDAL; PERINEURAL ONCE
Status: COMPLETED | OUTPATIENT
Start: 2022-06-28 | End: 2022-12-20

## 2022-06-28 RX ORDER — ACETAMINOPHEN 500 MG
1000 TABLET ORAL
Status: COMPLETED | OUTPATIENT
Start: 2022-06-28 | End: 2022-06-28

## 2022-06-28 RX ORDER — TIZANIDINE 2 MG/1
4 TABLET ORAL NIGHTLY
Status: DISCONTINUED | OUTPATIENT
Start: 2022-06-28 | End: 2022-07-05 | Stop reason: HOSPADM

## 2022-06-28 RX ORDER — MEPERIDINE HYDROCHLORIDE 25 MG/ML
12.5 INJECTION INTRAMUSCULAR; INTRAVENOUS; SUBCUTANEOUS ONCE AS NEEDED
Status: DISCONTINUED | OUTPATIENT
Start: 2022-06-28 | End: 2022-06-28 | Stop reason: HOSPADM

## 2022-06-28 RX ORDER — ALBUMIN HUMAN 50 G/1000ML
SOLUTION INTRAVENOUS CONTINUOUS PRN
Status: DISCONTINUED | OUTPATIENT
Start: 2022-06-28 | End: 2022-06-28

## 2022-06-28 RX ADMIN — FENTANYL CITRATE 50 MCG: 50 INJECTION, SOLUTION INTRAMUSCULAR; INTRAVENOUS at 07:06

## 2022-06-28 RX ADMIN — GABAPENTIN 300 MG: 300 CAPSULE ORAL at 08:06

## 2022-06-28 RX ADMIN — OXYCODONE 5 MG: 5 TABLET ORAL at 11:06

## 2022-06-28 RX ADMIN — IBUPROFEN 800 MG: 800 INJECTION INTRAVENOUS at 04:06

## 2022-06-28 RX ADMIN — MORPHINE SULFATE 2 MG: 10 INJECTION, SOLUTION INTRAMUSCULAR; INTRAVENOUS at 06:06

## 2022-06-28 RX ADMIN — IBUPROFEN 800 MG: 800 INJECTION INTRAVENOUS at 11:06

## 2022-06-28 RX ADMIN — FENTANYL CITRATE 50 MCG: 50 INJECTION, SOLUTION INTRAMUSCULAR; INTRAVENOUS at 10:06

## 2022-06-28 RX ADMIN — ROCURONIUM BROMIDE 20 MG: 10 SOLUTION INTRAVENOUS at 09:06

## 2022-06-28 RX ADMIN — BUPIVACAINE HYDROCHLORIDE 40 ML: 2.5 INJECTION, SOLUTION EPIDURAL; INFILTRATION; INTRACAUDAL; PERINEURAL at 06:06

## 2022-06-28 RX ADMIN — ROCURONIUM BROMIDE 40 MG: 10 SOLUTION INTRAVENOUS at 07:06

## 2022-06-28 RX ADMIN — LORAZEPAM 0.5 MG: 0.5 TABLET ORAL at 10:06

## 2022-06-28 RX ADMIN — OXYCODONE 5 MG: 5 TABLET ORAL at 03:06

## 2022-06-28 RX ADMIN — SODIUM CHLORIDE: 0.9 INJECTION, SOLUTION INTRAVENOUS at 03:06

## 2022-06-28 RX ADMIN — MUPIROCIN: 20 OINTMENT TOPICAL at 05:06

## 2022-06-28 RX ADMIN — OXYCODONE 5 MG: 5 TABLET ORAL at 08:06

## 2022-06-28 RX ADMIN — GENTAMICIN SULFATE 303 MG: 40 INJECTION, SOLUTION INTRAMUSCULAR; INTRAVENOUS at 07:06

## 2022-06-28 RX ADMIN — LIDOCAINE HYDROCHLORIDE 100 MG: 20 INJECTION, SOLUTION EPIDURAL; INFILTRATION; INTRACAUDAL; PERINEURAL at 07:06

## 2022-06-28 RX ADMIN — TIZANIDINE 4 MG: 2 TABLET ORAL at 08:06

## 2022-06-28 RX ADMIN — METRONIDAZOLE 500 MG: 500 INJECTION, SOLUTION INTRAVENOUS at 07:06

## 2022-06-28 RX ADMIN — ROCURONIUM BROMIDE 30 MG: 10 SOLUTION INTRAVENOUS at 07:06

## 2022-06-28 RX ADMIN — BUPIVACAINE 20 ML: 13.3 INJECTION, SUSPENSION, LIPOSOMAL INFILTRATION at 06:06

## 2022-06-28 RX ADMIN — ACETAMINOPHEN 1000 MG: 10 INJECTION INTRAVENOUS at 03:06

## 2022-06-28 RX ADMIN — PREGABALIN 50 MG: 50 CAPSULE ORAL at 05:06

## 2022-06-28 RX ADMIN — MUPIROCIN: 20 OINTMENT TOPICAL at 03:06

## 2022-06-28 RX ADMIN — PROPOFOL 100 MCG/KG/MIN: 10 INJECTION, EMULSION INTRAVENOUS at 07:06

## 2022-06-28 RX ADMIN — DIPHENHYDRAMINE HYDROCHLORIDE 12.5 MG: 50 INJECTION, SOLUTION INTRAMUSCULAR; INTRAVENOUS at 07:06

## 2022-06-28 RX ADMIN — KETOROLAC TROMETHAMINE 30 MG: 30 INJECTION, SOLUTION INTRAMUSCULAR; INTRAVENOUS at 10:06

## 2022-06-28 RX ADMIN — SCOLOPAMINE TRANSDERMAL SYSTEM 1 PATCH: 1 PATCH, EXTENDED RELEASE TRANSDERMAL at 05:06

## 2022-06-28 RX ADMIN — PROPOFOL 80 MCG/KG/MIN: 10 INJECTION, EMULSION INTRAVENOUS at 09:06

## 2022-06-28 RX ADMIN — PROPOFOL 250 MG: 10 INJECTION, EMULSION INTRAVENOUS at 07:06

## 2022-06-28 RX ADMIN — GABAPENTIN 300 MG: 300 CAPSULE ORAL at 04:06

## 2022-06-28 RX ADMIN — Medication 40 MG: at 07:06

## 2022-06-28 RX ADMIN — ACETAMINOPHEN 1000 MG: 10 INJECTION INTRAVENOUS at 10:06

## 2022-06-28 RX ADMIN — MIDAZOLAM HYDROCHLORIDE 2 MG: 1 INJECTION, SOLUTION INTRAMUSCULAR; INTRAVENOUS at 06:06

## 2022-06-28 RX ADMIN — SODIUM CHLORIDE, SODIUM LACTATE, POTASSIUM CHLORIDE, AND CALCIUM CHLORIDE: 600; 310; 30; 20 INJECTION, SOLUTION INTRAVENOUS at 07:06

## 2022-06-28 RX ADMIN — DEXAMETHASONE SODIUM PHOSPHATE 8 MG: 4 INJECTION, SOLUTION INTRAMUSCULAR; INTRAVENOUS at 07:06

## 2022-06-28 RX ADMIN — SUGAMMADEX 200 MG: 100 INJECTION, SOLUTION INTRAVENOUS at 10:06

## 2022-06-28 RX ADMIN — ONDANSETRON 4 MG: 2 INJECTION, SOLUTION INTRAMUSCULAR; INTRAVENOUS at 10:06

## 2022-06-28 RX ADMIN — MUPIROCIN: 20 OINTMENT TOPICAL at 08:06

## 2022-06-28 RX ADMIN — ROCURONIUM BROMIDE 15 MG: 10 SOLUTION INTRAVENOUS at 09:06

## 2022-06-28 RX ADMIN — ACETAMINOPHEN 1000 MG: 500 TABLET, FILM COATED ORAL at 05:06

## 2022-06-28 RX ADMIN — Medication 10 MG: at 08:06

## 2022-06-28 RX ADMIN — SODIUM CHLORIDE: 0.9 INJECTION, SOLUTION INTRAVENOUS at 10:06

## 2022-06-28 RX ADMIN — ROCURONIUM BROMIDE 15 MG: 10 SOLUTION INTRAVENOUS at 08:06

## 2022-06-28 RX ADMIN — SODIUM CHLORIDE 0.5 MCG/KG/HR: 9 INJECTION, SOLUTION INTRAMUSCULAR; INTRAVENOUS; SUBCUTANEOUS at 07:06

## 2022-06-28 RX ADMIN — ALBUMIN (HUMAN): 2.5 SOLUTION INTRAVENOUS at 09:06

## 2022-06-28 RX ADMIN — ALBUMIN (HUMAN): 2.5 SOLUTION INTRAVENOUS at 08:06

## 2022-06-28 NOTE — BRIEF OP NOTE
[facility] Surgery ([facility]  Brief Operative Note    SUMMARY     Surgery Date: 6/28/2022     Surgeon(s) and Role:     * Trinidad Lane MD - Primary    Assisting Surgeon: None    Pre-op Diagnosis:  Diverticulitis of large intestine without perforation or abscess without bleeding [K57.32]    Post-op Diagnosis:  Post-Op Diagnosis Codes:     * Diverticulitis of large intestine without perforation or abscess without bleeding [K57.32]    Procedure(s) (LRB):  XI ROBOTIC SIGMOID COLECTOMY, flexible sigmoidoscopy (N/A)    Anesthesia: General/Regional    Operative Findings: significant adhesions at rectosigmoid junction to vaginal cuff    Estimated Blood Loss: 150 mL    Estimated Blood Loss has been documented.         Specimens:   Specimen (24h ago, onward)             Start     Ordered    06/28/22 1036  Specimen to Pathology, Surgery General Surgery  Once        Comments: Pre-op Diagnosis: Diverticulitis of large intestine without perforation or abscess without bleeding [K57.32]Procedure(s):XI ROBOTIC SIGMOID COLECTOMY, flexible sigmoidoscopy Number of specimens: 1Name of specimens: 1. Sigmoid Colon     References:    Click here for ordering Quick Tip   Question Answer Comment   Procedure Type: General Surgery    Specimen Class: Routine/Screening    Which provider would you like to cc? TRINIDAD LANE    Release to patient Immediate        06/28/22 1036    06/28/22 1015  Specimen to Pathology, Surgery Other  Once,   Status:  Canceled        Comments: Pre-op Diagnosis: Diverticulitis of large intestine without perforation or abscess without bleeding [K57.32]Procedure(s):XI ROBOTIC SIGMOID COLECTOMY, flexible sigmoidoscopy Number of specimens: 1Name of specimens: 1. Sigmoid Colon     References:    Click here for ordering Quick Tip   Question Answer Comment   Procedure Type: Other    Specimen Class: Routine/Screening    Which provider would you like to cc? TRINIDAD LANE    Release to patient Immediate        06/28/22 1025                 [id number]

## 2022-06-28 NOTE — TRANSFER OF CARE
"Anesthesia Transfer of Care Note    Patient: Yaritza Shaw    Procedure(s) Performed: Procedure(s) (LRB):  XI ROBOTIC SIGMOID COLECTOMY, flexible sigmoidoscopy (N/A)    Patient location: PACU    Anesthesia Type: general    Transport from OR: Transported from OR on 6-10 L/min O2 by face mask with adequate spontaneous ventilation    Post pain: adequate analgesia    Post assessment: no apparent anesthetic complications and tolerated procedure well    Post vital signs: stable    Level of consciousness: sedated    Nausea/Vomiting: no nausea/vomiting    Complications: none    Transfer of care protocol was followed      Last vitals:   Visit Vitals  BP (!) 101/58 (BP Location: Right arm, Patient Position: Lying)   Pulse 67   Temp 36.2 °C (97.1 °F) (Temporal)   Resp 16   Ht 5' 2" (1.575 m)   Wt 76.4 kg (168 lb 6.9 oz)   SpO2 97%   Breastfeeding No   BMI 30.81 kg/m²     "

## 2022-06-28 NOTE — NURSING
Patient arrived to floor AAOx4. VS obtained and stable. Four eyes on skin preformed. 5 lap sites intact to abdomen with derma bond. Rivera catheter intact. No s/s of acute distress noted upon arrival. Care resumed.

## 2022-06-28 NOTE — ANESTHESIA PROCEDURE NOTES
Intubation    Date/Time: 6/28/2022 7:12 AM  Performed by: Maria R Teixeira CRNA  Authorized by: Orlin Lewis MD     Intubation:     Induction:  Intravenous    Intubated:  Postinduction    Mask Ventilation:  Easy mask    Attempts:  1    Attempted By:  CRNA    Method of Intubation:  Video laryngoscopy    Blade:  Pride 3    Laryngeal View Grade: Grade I - full view of cords      Difficult Airway Encountered?: No      Complications:  None    Airway Device:  Oral endotracheal tube    Airway Device Size:  7.5    Style/Cuff Inflation:  Cuffed (inflated to minimal occlusive pressure)    Tube secured:  22    Secured at:  The lips    Placement Verified By:  Capnometry    Complicating Factors:  None    Findings Post-Intubation:  BS equal bilateral and atraumatic/condition of teeth unchanged

## 2022-06-28 NOTE — INTERVAL H&P NOTE
The patient has been examined and the H&P has been reviewed:    I concur with the findings and no changes have occurred since H&P was written.    Surgery risks, benefits and alternative options discussed and understood by patient/family.      Trinidad Becerra MD  Staff Surgeon   Colon & Rectal Surgery

## 2022-06-28 NOTE — ANESTHESIA PROCEDURE NOTES
Bilateral TAP    Patient location during procedure: holding area   Block not for primary anesthetic.  Reason for block: at surgeon's request and post-op pain management   Post-op Pain Location: lower abdomen   Timeout: 6/28/2022 6:35 AM   End time: 6/28/2022 6:42 AM    Staffing  Authorizing Provider: Davis Bocanegra MD  Performing Provider: Davis Bocanegra MD    Preanesthetic Checklist  Completed: patient identified, IV checked, site marked, risks and benefits discussed, surgical consent, monitors and equipment checked, pre-op evaluation and timeout performed  Peripheral Block  Patient position: supine  Prep: ChloraPrep and site prepped and draped  Patient monitoring: heart rate and continuous pulse ox  Block type: TAP  Laterality: bilateral  Injection technique: single shot  Needle  Needle type: Echogenic   Needle gauge: 21 G  Needle length: 4 in  Needle localization: anatomical landmarks and ultrasound guidance   -ultrasound image captured on disc.  Assessment  Injection assessment: negative aspiration, negative parasthesia and local visualized surrounding nerve  Paresthesia pain: none  Heart rate change: no  Slow fractionated injection: yes  Pain Tolerance: comfortable throughout block and no complaints  Medications:    Medications: bupivacaine (pf) (MARCAINE) injection 0.25% - Perineural, Bilateral Transabdominus Plane   40 mL - 6/28/2022 6:41:00 AM    Additional Notes  Local visualized to spread between internal oblique and transversus abdominis  Each side injected with 20 cc 0.25% Marcaine plus 10 cc Exparel

## 2022-06-28 NOTE — ANESTHESIA POSTPROCEDURE EVALUATION
Anesthesia Post Evaluation    Patient: Yaritza Shaw    Procedure(s) Performed: Procedure(s) (LRB):  XI ROBOTIC SIGMOID COLECTOMY, flexible sigmoidoscopy (N/A)    Final Anesthesia Type: general      Patient location during evaluation: PACU  Patient participation: Yes- Able to Participate  Level of consciousness: awake and alert  Post-procedure vital signs: reviewed and stable  Pain management: adequate  Airway patency: patent    PONV status at discharge: No PONV  Anesthetic complications: no      Cardiovascular status: blood pressure returned to baseline  Respiratory status: unassisted, spontaneous ventilation and room air  Hydration status: euvolemic  Follow-up not needed.          Vitals Value Taken Time   /61 06/28/22 1244   Temp 36.4 °C (97.5 °F) 06/28/22 1115   Pulse 66 06/28/22 1256   Resp 16 06/28/22 1215   SpO2 98 % 06/28/22 1256   Vitals shown include unvalidated device data.      No case tracking events are documented in the log.      Pain/Phil Score: Pain Rating Prior to Med Admin: 4 (6/28/2022 11:46 AM)  Phil Score: 8 (6/28/2022 11:42 AM)

## 2022-06-28 NOTE — OP NOTE
Date of surgery: 06/28/2022    Operative Report  Pre-operative diagnosis: diverticulitis   Post-operative diagnosis: Same as above    Procedure:  1. Robotic-assisted sigmoid colectomy   2. Flexible sigmoidoscopy     Findings:  1. Negative leak test with patent anastomosis on flexible sigmoidoscopy    Surgeon: Trinidad Becerra MD   Assistant: Alberto Ly MD, fellow    Indication: Ms. Shaw is a 42 year old woman with a history of diverticulitis  who is scheduled to undergo robotic sigmoid colectomy. The benefits, risks, and alternatives were discussed with the patient, they were given the opportunity to ask questions and they elected to proceed with operative intervention after signing written consent.    Procedure:  After pre-operative assessment and review of informed consent, the patient was taken to the operating room and received general anesthesia. A steve catheter was then placed under strict sterile precautions. Pre-operative antibiotics were administered if indicated and the patient was placed in lithotomy position. The abdomen was prepped and draped in the usual sterile fashion and a timeout was performed according to [facility] Quality and Safety guidelines.      An incision was made at montague's point.  A veress needle was inserted into the abdominal cavity and a negative drop test was performed.  Insufflation was turned on and pneumoperitoneum was achieved.  An 8 mm trocar was placed at that site with an optiview technique.  Two additional 8 mm trocars and one 12 mm trocar were placed in the standard fashion under direct laparoscopic visualization.  A  5 mm airseal was placed in the right upper quadrant.  The patient was then placed in steep trendelenberg.  The [employer] robot was then brought onto the field and docked.  A small grasping retractor, a fenestrated bipolar and scissors were introduced into the field.      The patient was noted to have omental adhesions to the abdominal wall.  These were taken  down with electrocautery.  The omentum was then swept cephalad.  The patient was noted to have adhesions of the sigmoid colon to the left sidewall as well as the vaginal cuff.  The sigmoid was grasped and elevated.  The peritoneum was scored and the posterior mesorectal plane identified.  We elevated the superior hemorrhoidal artery and continued our dissection bluntly until we identified the left ureter.  This was swept down and out of the field of dissection.  We carried our dissection proximally to the SHERRY pedicle.  This was isolated and then ligated and divided with the vessel sealer.  We then continued our dissection proximally.  We then mobilized the lateral colon along the white line of toldt.  We then began our distal dissection.  We found significant adhesions between the vaginal cuff and the rectosigmoid colon.  We carefully  these structures until we were distal to the involved area of rectum.  We then ligated and divided the mesorectum, just distal to the peritoneal reflection.  We then divided the rectum with a sureform 45 stapler.    Proximally, the colon was healthy and noninflammed without sigifincant diverticular disease.  We identified our ligated SHERRY pedicle and divided the mesentery up to the border of the colon with the vessel sealer.  Anesthesia then gave 5 cc of indocyanine green intravenously and our colon conduit and rectal stump were both well perfused.  We then made a large colotomy at the divided mesentery.  We made a second partial thickeness colotomy a few centimeters proximally.  We then inserted a 29 mm EEA anvil into the colon and divided the colon with an additional sureform 45 blue load. We then placed the colon specimen out of the way.      EEA sizers were then passed below and the conduit reached easily to the rectal stump.  Dr. Ly then passed the EEA stapler.  The stapler was opened and the two ends mated.  We ensured that the colon conduit was properly oriented and  that the vagina was not involved in the staple line prior to closing the stapler.  We then fired the stapler.  The anastomotic rings were complete.  We then occluded the colon and filled the pelvis with saline.  Flexible sigmoidoscopy was performed. There was no active bleeding at the anastomotic staple line and the leak test was negative.  The saline was suctioned from the pelvis and hemostasis was appropriate.     A locking grasper was placed on the specimen. The robotic instruments were removed under laparoscopic visualization and the robot undocked and removed from the field.  The 12 mm trocar site was enlarged and an princess wound protector was placed in the wound.  The specimen was passed through the wound and sent for specimen.  The fascia was closed with running PDS suture.  The wounds were irrigated and closed with monocryl.  The abdomen was cleaned and dried and dressed with dermabond.     Prior to closure and after final closure instrument, needle, and sponge counts were correct.    The procedure was completed without complication and was well-tolerated. The patient was then brought to the post-anesthesia care unit in stable condition. I was present for the entire operation.    Complications: None  Estimated blood loss: 150 mL  Disposition: PACU    Trinidad Becerra MD  Staff Surgeon   Colon & Rectal Surgery

## 2022-06-29 LAB
ANION GAP SERPL CALC-SCNC: 10 MMOL/L (ref 8–16)
BASOPHILS # BLD AUTO: 0.02 K/UL (ref 0–0.2)
BASOPHILS NFR BLD: 0.1 % (ref 0–1.9)
BUN SERPL-MCNC: 16 MG/DL (ref 6–20)
CALCIUM SERPL-MCNC: 8.7 MG/DL (ref 8.7–10.5)
CHLORIDE SERPL-SCNC: 110 MMOL/L (ref 95–110)
CO2 SERPL-SCNC: 22 MMOL/L (ref 23–29)
CREAT SERPL-MCNC: 0.7 MG/DL (ref 0.5–1.4)
DIFFERENTIAL METHOD: ABNORMAL
EOSINOPHIL # BLD AUTO: 0 K/UL (ref 0–0.5)
EOSINOPHIL NFR BLD: 0 % (ref 0–8)
ERYTHROCYTE [DISTWIDTH] IN BLOOD BY AUTOMATED COUNT: 13.1 % (ref 11.5–14.5)
EST. GFR  (AFRICAN AMERICAN): >60 ML/MIN/1.73 M^2
EST. GFR  (NON AFRICAN AMERICAN): >60 ML/MIN/1.73 M^2
GLUCOSE SERPL-MCNC: 127 MG/DL (ref 70–110)
HCT VFR BLD AUTO: 35.4 % (ref 37–48.5)
HGB BLD-MCNC: 11.7 G/DL (ref 12–16)
IMM GRANULOCYTES # BLD AUTO: 0.1 K/UL (ref 0–0.04)
IMM GRANULOCYTES NFR BLD AUTO: 0.7 % (ref 0–0.5)
LYMPHOCYTES # BLD AUTO: 1.3 K/UL (ref 1–4.8)
LYMPHOCYTES NFR BLD: 9.1 % (ref 18–48)
MCH RBC QN AUTO: 31 PG (ref 27–31)
MCHC RBC AUTO-ENTMCNC: 33.1 G/DL (ref 32–36)
MCV RBC AUTO: 94 FL (ref 82–98)
MONOCYTES # BLD AUTO: 1 K/UL (ref 0.3–1)
MONOCYTES NFR BLD: 6.5 % (ref 4–15)
NEUTROPHILS # BLD AUTO: 12.2 K/UL (ref 1.8–7.7)
NEUTROPHILS NFR BLD: 83.6 % (ref 38–73)
NRBC BLD-RTO: 0 /100 WBC
PLATELET # BLD AUTO: 225 K/UL (ref 150–450)
PMV BLD AUTO: 10.3 FL (ref 9.2–12.9)
POTASSIUM SERPL-SCNC: 4.1 MMOL/L (ref 3.5–5.1)
RBC # BLD AUTO: 3.78 M/UL (ref 4–5.4)
SODIUM SERPL-SCNC: 142 MMOL/L (ref 136–145)
TROPONIN I SERPL DL<=0.01 NG/ML-MCNC: <0.006 NG/ML (ref 0–0.03)
WBC # BLD AUTO: 14.55 K/UL (ref 3.9–12.7)

## 2022-06-29 PROCEDURE — 80048 BASIC METABOLIC PNL TOTAL CA: CPT | Performed by: SURGERY

## 2022-06-29 PROCEDURE — 99900035 HC TECH TIME PER 15 MIN (STAT)

## 2022-06-29 PROCEDURE — 93010 EKG 12-LEAD: ICD-10-PCS | Mod: ,,, | Performed by: INTERNAL MEDICINE

## 2022-06-29 PROCEDURE — 63600175 PHARM REV CODE 636 W HCPCS: Performed by: SURGERY

## 2022-06-29 PROCEDURE — 27000221 HC OXYGEN, UP TO 24 HOURS

## 2022-06-29 PROCEDURE — 94761 N-INVAS EAR/PLS OXIMETRY MLT: CPT

## 2022-06-29 PROCEDURE — 93005 ELECTROCARDIOGRAM TRACING: CPT

## 2022-06-29 PROCEDURE — 93010 ELECTROCARDIOGRAM REPORT: CPT | Mod: ,,, | Performed by: INTERNAL MEDICINE

## 2022-06-29 PROCEDURE — 36415 COLL VENOUS BLD VENIPUNCTURE: CPT | Performed by: SURGERY

## 2022-06-29 PROCEDURE — 97161 PT EVAL LOW COMPLEX 20 MIN: CPT

## 2022-06-29 PROCEDURE — 84484 ASSAY OF TROPONIN QUANT: CPT | Performed by: SURGERY

## 2022-06-29 PROCEDURE — 85025 COMPLETE CBC W/AUTO DIFF WBC: CPT | Performed by: SURGERY

## 2022-06-29 PROCEDURE — 21400001 HC TELEMETRY ROOM

## 2022-06-29 PROCEDURE — 97535 SELF CARE MNGMENT TRAINING: CPT

## 2022-06-29 PROCEDURE — 25000003 PHARM REV CODE 250: Performed by: SURGERY

## 2022-06-29 PROCEDURE — 97165 OT EVAL LOW COMPLEX 30 MIN: CPT

## 2022-06-29 RX ORDER — MORPHINE SULFATE 2 MG/ML
2 INJECTION, SOLUTION INTRAMUSCULAR; INTRAVENOUS EVERY 4 HOURS PRN
Status: DISCONTINUED | OUTPATIENT
Start: 2022-06-29 | End: 2022-07-05 | Stop reason: HOSPADM

## 2022-06-29 RX ORDER — FAMOTIDINE 20 MG/1
20 TABLET, FILM COATED ORAL 2 TIMES DAILY
Status: DISCONTINUED | OUTPATIENT
Start: 2022-06-29 | End: 2022-07-05 | Stop reason: HOSPADM

## 2022-06-29 RX ADMIN — ACETAMINOPHEN 1000 MG: 500 TABLET, FILM COATED ORAL at 01:06

## 2022-06-29 RX ADMIN — GABAPENTIN 300 MG: 300 CAPSULE ORAL at 08:06

## 2022-06-29 RX ADMIN — GABAPENTIN 300 MG: 300 CAPSULE ORAL at 09:06

## 2022-06-29 RX ADMIN — OXYCODONE 5 MG: 5 TABLET ORAL at 11:06

## 2022-06-29 RX ADMIN — MORPHINE SULFATE 2 MG: 10 INJECTION, SOLUTION INTRAMUSCULAR; INTRAVENOUS at 02:06

## 2022-06-29 RX ADMIN — IBUPROFEN 800 MG: 800 INJECTION INTRAVENOUS at 06:06

## 2022-06-29 RX ADMIN — IBUPROFEN 800 MG: 400 TABLET, FILM COATED ORAL at 01:06

## 2022-06-29 RX ADMIN — VENLAFAXINE HYDROCHLORIDE 75 MG: 75 CAPSULE, EXTENDED RELEASE ORAL at 08:06

## 2022-06-29 RX ADMIN — OXYCODONE 5 MG: 5 TABLET ORAL at 08:06

## 2022-06-29 RX ADMIN — MORPHINE SULFATE 2 MG: 2 INJECTION, SOLUTION INTRAMUSCULAR; INTRAVENOUS at 01:06

## 2022-06-29 RX ADMIN — BUPROPION HYDROCHLORIDE 150 MG: 150 TABLET, FILM COATED, EXTENDED RELEASE ORAL at 08:06

## 2022-06-29 RX ADMIN — ONDANSETRON 4 MG: 2 INJECTION INTRAMUSCULAR; INTRAVENOUS at 01:06

## 2022-06-29 RX ADMIN — ACETAMINOPHEN 1000 MG: 10 INJECTION INTRAVENOUS at 06:06

## 2022-06-29 RX ADMIN — GABAPENTIN 300 MG: 300 CAPSULE ORAL at 03:06

## 2022-06-29 RX ADMIN — FAMOTIDINE 20 MG: 20 TABLET ORAL at 09:06

## 2022-06-29 RX ADMIN — ONDANSETRON 4 MG: 2 INJECTION INTRAMUSCULAR; INTRAVENOUS at 12:06

## 2022-06-29 RX ADMIN — IBUPROFEN 800 MG: 400 TABLET, FILM COATED ORAL at 09:06

## 2022-06-29 RX ADMIN — OXYCODONE 5 MG: 5 TABLET ORAL at 06:06

## 2022-06-29 RX ADMIN — MUPIROCIN: 20 OINTMENT TOPICAL at 09:06

## 2022-06-29 RX ADMIN — PROMETHAZINE HYDROCHLORIDE 12.5 MG: 25 INJECTION INTRAMUSCULAR; INTRAVENOUS at 05:06

## 2022-06-29 RX ADMIN — ACETAMINOPHEN 1000 MG: 500 TABLET, FILM COATED ORAL at 09:06

## 2022-06-29 RX ADMIN — MUPIROCIN: 20 OINTMENT TOPICAL at 08:06

## 2022-06-29 RX ADMIN — OXYCODONE 5 MG: 5 TABLET ORAL at 03:06

## 2022-06-29 RX ADMIN — TIZANIDINE 4 MG: 2 TABLET ORAL at 09:06

## 2022-06-29 NOTE — PLAN OF CARE
Problem: Physical Therapy  Goal: Physical Therapy Goal  Description: Goals to be met by: 7/29/22    Patient will perform the following to increase strength, improve mobility, and return to prior level of function:    1. Supine <> sit with SPV.  2. Sit<>stand with modified independence with least restrictive assistive device.  3. Gait x 150 feet with modified independence with least restrictive assistive device.      Outcome: Ongoing, Progressing       PT orders received. PT evaluation completed and goals/POC established. Pt tolerated evaluation well with no adverse reactions. Pt performed in-room ambulation with assistance. PT will continue to follow and progress goals as tolerated. Recommend discharge to home with HHPT and family assistance as needed.

## 2022-06-29 NOTE — NURSING
MD Irvin notified about pt discomfort. MD do not want me to remove the steve tonight. Will continue with care.

## 2022-06-29 NOTE — PT/OT/SLP EVAL
Physical Therapy Evaluation    Patient Name:  Yaritza Shaw   MRN:  [id number]    Recommendations:     Discharge Recommendations:  home health PT   Discharge Equipment Recommendations: none   Barriers to discharge: Inaccessible home, medical treatment and functional mobility impairments    Assessment:     Yaritza Shaw is a 42 y.o. female admitted with a medical diagnosis of Diverticulitis of large intestine without perforation or abscess without bleeding; she is s/p robotic sigmoid colectomy. She has a past medical history that includes but is not limited to PTSD, CAD, GERD, MDD, insomnia, cervical spinal stenosis, and appendicitis.  She presents with the following impairments/functional limitations:  weakness, impaired endurance, impaired self care skills, impaired functional mobilty, gait instability, impaired balance, decreased lower extremity function, pain.    PT orders received. PT evaluation completed and goals/POC established. Pt tolerated evaluation well with no adverse reactions. Pt performed in-room ambulation with assistance. PT will continue to follow and progress goals as tolerated. Recommend discharge to home with HHPT and family assistance as needed.     Rehab Prognosis: Good; patient would benefit from acute skilled PT services to address these deficits and reach maximum level of function.    Recent Surgery: Procedure(s) (LRB):  XI ROBOTIC SIGMOID COLECTOMY, flexible sigmoidoscopy (N/A) 1 Day Post-Op    Plan:     During this hospitalization, patient to be seen 3 x/week to address the identified rehab impairments via gait training, therapeutic activities, therapeutic exercises, neuromuscular re-education and progress toward the following goals:    · Plan of Care Expires:  07/29/22    Subjective     Chief Complaint: incisional pain  Patient/Family Comments/goals: Pt agreeable to sit up in chair after evaluation  Pain/Comfort:  · Pain Rating 1: 8/10  · Location - Side 1: Bilateral  · Location -  Orientation 1: generalized  · Location 1: abdomen  · Pain Addressed 1: Reposition, Distraction, Cessation of Activity    Patients cultural, spiritual, Mandaen conflicts given the current situation: no    Living Environment:  Pt lives with boyfriend in a H with 3 VAL (handrail available). She has access to a tub-shower combination in the home with a grab bar. Prior to admission, patients level of function was independent and ambulatory.  Equipment used at home: none.  DME owned (not currently used): none.  Upon discharge, patient will have assistance from boyfriend.    Objective:     Communicated with BILLY Nieves prior to session.  Patient found ambulatory in room/whyte with telemetry, peripheral IV  upon PT entry to room.    General Precautions: Standard, fall   Orthopedic Precautions:N/A   Braces: N/A  Respiratory Status: Room air    Exams:  · Cognition:   · Patient is oriented to self, place, time and situation.  · Pt follows approximately 100% of simple commands.    · Mood: Pleasant and cooperative.   · Safety Awareness: intact  · Musculoskeletal:  · Posture:  Forward-flexed, guarding of abdomen  · LE ROM/Strength:   · R ROM: WFL  · L ROM: WFL  · R Strength: No force applied secondary to incisional pain  · Hip flexion: 3/5  · Knee extension: 3/5  · Dorsiflexion: 3/5   · L Strength: No force applied secondary to incisional pain  · Hip flexion: 3/5  · Knee extension: 3/5  · Dorsiflexion: 3/5   · Neuromuscular:  · Sensation: Intact to light touch bilateral LEs.   · Tone/Reflexes: No impairments identified with functional mobility. No formal testing performed.   · Balance:   · Static sitting: SPV  · Dynamic sitting: SPV  · Static standing: CGA  · Dynamic standing: CGA  · Visual-vestibular: No impairments identified with functional mobility. No formal testing performed.  · Integument: Visible skin intact  · Cardiopulmonary:  · Edema: none noted     Functional Mobility:  · Transfers:     · Sit to Stand:  contact guard  assistance with hand-held assist  · Gait: combined ~30 feet with contact guard assistance. Pt with decreased speed, increased asha, and decreased bilateral step length. Decreased stability, however, no LOB noted. Pt with forward-flexed posture secondary to abdominal guarding.     Therapeutic Activities and Exercises:  Bed mobility, transfer, and gait training as described above.    PT educated patient and boyfriend re:   · PT plan of care/role of PT  · Fall and safety precautions  · Gait deviations  · Use of call light (don't get up without assistance)  Pt and boyfriend verbalized understanding     The patient is safe to transfer with RN assist  Patient encouraged to sit up in chair throughout the day to prevent deconditioning.     AM-PAC 6 CLICK MOBILITY  Total Score:18     Patient left up in chair with all lines intact, call button in reach and boyfriend present.    GOALS:   Multidisciplinary Problems     Physical Therapy Goals        Problem: Physical Therapy    Goal Priority Disciplines Outcome Goal Variances Interventions   Physical Therapy Goal     PT, PT/OT Ongoing, Progressing     Description: Goals to be met by: 7/29/22    Patient will perform the following to increase strength, improve mobility, and return to prior level of function:    1. Supine <> sit with SPV.  2. Sit<>stand with modified independence with least restrictive assistive device.  3. Gait x 150 feet with modified independence with least restrictive assistive device.                       History:     Past Medical History:   Diagnosis Date    Arthritis     Asthma     Bronchitis     Diverticulitis     Diverticulitis     Frozen shoulder     left    Kidney stones     PONV (postoperative nausea and vomiting)     Spinal stenosis     Thyroid disease        Past Surgical History:   Procedure Laterality Date    ANTERIOR CERVICAL DISCECTOMY W/ FUSION N/A 11/21/2018    Procedure: DISCECTOMY, SPINE, CERVICAL, ANTERIOR APPROACH, WITH  FUSION, C4-5 C5-6 W/ AUTOGRAFT FUSION/CAGE/PLATE;  Surgeon: Michele Chatterjee MD;  [facility];  Service: Neurosurgery;  Laterality: N/A;    APPENDECTOMY      CHOLECYSTECTOMY      HYSTERECTOMY      TUBAL LIGATION      TUBAL LIGATION         Time Tracking:     PT Received On: 06/29/22  PT Start Time: 0910     PT Stop Time: 0917  PT Total Time (min): 7 min     Billable Minutes: Evaluation 7      06/29/2022

## 2022-06-29 NOTE — PROGRESS NOTES
Surgery Inpatient Progress Note    Date: 06/29/2022    Overnight events: Out of bed this morning.  Had bowel movement.  Tolerating diet.     O:   Vitals:    06/29/22 0800   BP:    Pulse: 60   Resp:    Temp:        Physical Exam   Gen: well developed female, NAD  HEENT: normocephalic, atraumatic, PERRL, EOMI   CV: RRR, no murmurs  Resp: nonlabored, CTAB   Abd: soft, incisions well approximated without erythema or drainage  : steve catheter in place     Labs: reviewed     Lab Results   Component Value Date/Time    INR 1.1 10/02/2021 03:35 PM    INR 0.9 06/14/2017 05:21 PM        Assessment and plan:   Yaritza Shaw is a 42 y.o. female who is POD 1 s/p robotic sigmoid colectomy    - patient tolerating diet and having bowel function  - dc steve today  - OOB, ambulate  - possible DC in next 24-48 hours pending progress       Trinidad Becerra MD  Staff Surgeon   Colon & Rectal Surgery

## 2022-06-29 NOTE — PLAN OF CARE
Discussed Plan of care with patient. Verbalizes understanding. Patient is AAO x4. No falls, accidents, or traumas at this time. Cardiac Monitor: Normal Sinus Rhythm. Patient is on room air. IV site clean, dry, and intact. Lap sites intact to abdomen with derma bond. Rivera catheter removed this a.m. Patient voiding spontaneously w/o difficulty. Pain managed per MD orders. Bed is in lowest position, SR up x2, Call light within reach. Will continue to monitor.     Problem: Adult Inpatient Plan of Care  Goal: Plan of Care Review  Outcome: Ongoing, Progressing  Goal: Patient-Specific Goal (Individualized)  Outcome: Ongoing, Progressing  Goal: Absence of Hospital-Acquired Illness or Injury  Outcome: Ongoing, Progressing  Goal: Optimal Comfort and Wellbeing  Outcome: Ongoing, Progressing  Goal: Readiness for Transition of Care  Outcome: Ongoing, Progressing     Problem: Infection  Goal: Absence of Infection Signs and Symptoms  Outcome: Ongoing, Progressing     Problem: Fall Injury Risk  Goal: Absence of Fall and Fall-Related Injury  Outcome: Ongoing, Progressing

## 2022-06-29 NOTE — PLAN OF CARE
Problem: Adult Inpatient Plan of Care  Goal: Plan of Care Review  Outcome: Ongoing, Progressing  Goal: Patient-Specific Goal (Individualized)  Outcome: Ongoing, Progressing  Goal: Optimal Comfort and Wellbeing  Outcome: Ongoing, Progressing  Goal: Readiness for Transition of Care  Outcome: Ongoing, Progressing     Problem: Infection  Goal: Absence of Infection Signs and Symptoms  Outcome: Ongoing, Progressing     Problem: Fall Injury Risk  Goal: Absence of Fall and Fall-Related Injury  Outcome: Ongoing, Progressing

## 2022-06-29 NOTE — NURSING
Pt complains of chest tightness and chest feeling heavy. Pt hypotensive. MD Irvin was notified of this matter. New orders in place.Will continue with care.

## 2022-06-29 NOTE — PLAN OF CARE
Problem: Occupational Therapy  Goal: Occupational Therapy Goal  Description: Goals to be met by: 7/2/22    Patient will increase functional independence with ADLs by performing:    Pt will be Indep with use of adaptive equipment to progress to Mod I level with LB self care tasks.    Outcome: Ongoing, Progressing   Evaluation complete and goals established.  Due to pt's left UE and LE neurological impairments and abdominal surgery, pt is needing assist with LB self care.  Pt and boyfriend educated on use of adaptive equipment to increase pt's Indep with LB self care.  Training in use of reacher, sock aid, long handle shoe horn and long handle bath sponge during next tx session and pt expected to discharge from OT services.  Recommend shower chair and pt have assist/supervision when returning home after medically cleared.

## 2022-06-29 NOTE — PT/OT/SLP EVAL
Occupational Therapy   Evaluation and Treatment    Name: Yaritza Shaw  MRN: [id number]  Admitting Diagnosis:  Diverticulitis of large intestine without perforation or abscess without bleeding  Recent Surgery: Procedure(s) (LRB):  XI ROBOTIC  COLECTOMY (N/A) 1 Day Post-Op    Recommendations:     Discharge Recommendations:  (Home with assist from boyfriend and family.)  Discharge Equipment Recommendations:   (Shower chair if pt feels she needs it after being cleared to shower.)  Barriers to discharge:   (Defer to MD for medical status.)    Assessment:     Yaritza Shaw is a 42 y.o. female with a medical diagnosis of Diverticulitis of large intestine without perforation or abscess without bleeding.  She presents agreeable to OT evaluation and tx session.  Pt was previously being seen by Physical Therapy due to impaired left UE and LE function after ACDF due to C spine spinal stenosis.  Performance deficits affecting function: weakness, impaired endurance, impaired self care skills, impaired functional mobilty, gait instability, impaired balance, decreased lower extremity function, decreased ROM, pain, impaired skin.      Rehab Prognosis: Good; patient would benefit from acute skilled OT services to address these deficits and reach maximum level of function.       Plan:     Patient to be seen 2 x/week to address the above listed problems via self-care/home management  · Plan of Care Expires: 07/29/22  · Plan of Care Reviewed with: patient, significant other    Subjective     Chief Complaint: Abdominal pain and nausea  Patient/Family Comments/goals: To return to home setting when medically cleared.    Pt reports going to Physical Therapy for a long time after ACDF and that sometimes her left toes drag when walking due to left LE weakness.  Pt stating she is going to address this with PT but had to have abdominal surgery first.  Pt stating a brace was discussed but would not be addressed until after she had her  recent surgery.    Occupational Profile:  Living Environment: Lives with boyfriend in [facility], 3 VAL and handrail  Previous level of function: Indep  Roles and Routines:   Equipment Used at Home:  none  Assistance upon Discharge: Boyfriend, sister and children    Pain/Comfort:  · Pain Rating 1: 8/10  · Location - Side 1: Right  · Location 1: abdomen  · Pain Addressed 1: Pre-medicate for activity, Reposition, Distraction, Cessation of Activity, Nurse notified (Nausea)  · Pain Rating Post-Intervention 1: 8/10    Patients cultural, spiritual, Caodaism conflicts given the current situation: no    Objective:     Communicated with: nurseLizbeth, prior to session.  Patient found HOB elevated with peripheral IV, telemetry upon OT entry to room.  Pt's boyfriend present.     General Precautions: Standard, fall   Orthopedic Precautions:  ACDF about 3 years ago with pt now having neurological deficits in left UE and LE since surgery (weakness, decreased coordination and motor control, tremors/twitching)  Braces: N/A  Respiratory Status: Room air    Occupational Performance:    Bed Mobility:    · Supine<>Sit: SBA    Functional Mobility/Transfers:  · Sit to stand: SBA  · Transfers: SBA  · Functional Mobility: No LOB, slow ambulation and guarding of abdomen    Activities of Daily Living:  · Feeding: Indep  · Dressing: Min A; pt unable to reach left foot or cross left foot over right knee for LB dressing    Cognitive/Visual Perceptual:  Cognitive/Psychosocial Skills:     -       Oriented to: Person, Place, Time and Situation   -       Follows Commands/attention:Follows one-step commands  -       Communication: clear/fluent  -       Memory: No Deficits noted  -       Safety awareness/insight to disability: intact   -       Mood/Affect/Coping skills/emotional control: Cooperative and Pleasant    Physical Exam:  UE AROM: Right UE is WFL; Left UE is grossly 40% at shoulder and mildly limited elbow to hand.  UE Strength: Right side stronger  than left; Pt uses left hand as dominant for safety\No formal MMT  UE CoordinatIon: Right side is WFL for self care; Left side with decreased speed and accuracy  Sensation: Light touch grossly intact.  Edema: None noted on visible skin    AMPAC 6 Click ADL:  AMPAC Total Score: 19    Treatment & Education:  Role of OT, POC, adaptive techniques for LB self care tasks, education on adaptive equipment, discharge recs, safety    Education:    Patient left HOB and FOB elevated with all lines intact, call button in reach, nurse notified and pt's boyfriend  present    GOALS:   Multidisciplinary Problems     Occupational Therapy Goals        Problem: Occupational Therapy    Goal Priority Disciplines Outcome Interventions   Occupational Therapy Goal     OT, PT/OT Ongoing, Progressing    Description: Goals to be met by: 7/2/22    Patient will increase functional independence with ADLs by performing:    Pt will be Indep with use of adaptive equipment to progress to Mod I level with LB self care tasks.                     History:     Past Medical History:   Diagnosis Date    Arthritis     Asthma     Bronchitis     Diverticulitis     Diverticulitis     Frozen shoulder     left    Kidney stones     PONV (postoperative nausea and vomiting)     Spinal stenosis     Thyroid disease        Past Surgical History:   Procedure Laterality Date    ANTERIOR CERVICAL DISCECTOMY W/ FUSION N/A 11/21/2018    Procedure: DISCECTOMY, SPINE, CERVICAL, ANTERIOR APPROACH, WITH FUSION, C4-5 C5-6 W/ AUTOGRAFT FUSION/CAGE/PLATE;  Surgeon: Michele Chatterjee MD;  Location: [facility] OR;  Service: Neurosurgery;  Laterality: N/A;    APPENDECTOMY      CHOLECYSTECTOMY      HYSTERECTOMY      ROBOT-ASSISTED LAPAROSCOPIC COLECTOMY USING DA LUISITO XI N/A 6/28/2022    Procedure: XI ROBOTIC  COLECTOMY;  Surgeon: Trinidad Becerra MD;  Location: [facility] OR;  Service: Colon and Rectal;  Laterality: N/A;  FLEXIBLE SIGMOIDOSCOPY     TUBAL LIGATION      TUBAL  LIGATION         Time Tracking:     OT Date of Treatment: 06/29/22  OT Start Time: 1449  OT Stop Time: 1511  OT Total Time (min): 22 min    Billable Minutes:Evaluation 12  Self Care/Home Management 10    6/29/2022

## 2022-06-29 NOTE — NURSING
"Pt in bed AAOX4. RR even and unlabored. IV site accessible. Pt complaints of some pain and discomfort to steve region. Pt states "Feels like the steve is leaking and I have pressure." Steve was assessed,  No leakage noted and pt have accurate out put. Pt wants steve removed. I will notified MD of this matter. Will continue to monitor.   "

## 2022-06-30 PROCEDURE — 63600175 PHARM REV CODE 636 W HCPCS: Performed by: SURGERY

## 2022-06-30 PROCEDURE — 11000001 HC ACUTE MED/SURG PRIVATE ROOM

## 2022-06-30 PROCEDURE — 25000003 PHARM REV CODE 250: Performed by: SURGERY

## 2022-06-30 PROCEDURE — 97116 GAIT TRAINING THERAPY: CPT

## 2022-06-30 RX ORDER — PROMETHAZINE HYDROCHLORIDE 12.5 MG/1
12.5 TABLET ORAL EVERY 6 HOURS PRN
Status: DISCONTINUED | OUTPATIENT
Start: 2022-06-30 | End: 2022-07-05 | Stop reason: HOSPADM

## 2022-06-30 RX ADMIN — FAMOTIDINE 20 MG: 20 TABLET ORAL at 08:06

## 2022-06-30 RX ADMIN — PROMETHAZINE HYDROCHLORIDE 12.5 MG: 12.5 TABLET ORAL at 11:06

## 2022-06-30 RX ADMIN — TIZANIDINE 4 MG: 2 TABLET ORAL at 08:06

## 2022-06-30 RX ADMIN — ACETAMINOPHEN 1000 MG: 500 TABLET, FILM COATED ORAL at 02:06

## 2022-06-30 RX ADMIN — GABAPENTIN 300 MG: 300 CAPSULE ORAL at 08:06

## 2022-06-30 RX ADMIN — IBUPROFEN 800 MG: 400 TABLET, FILM COATED ORAL at 06:06

## 2022-06-30 RX ADMIN — VENLAFAXINE HYDROCHLORIDE 75 MG: 75 CAPSULE, EXTENDED RELEASE ORAL at 08:06

## 2022-06-30 RX ADMIN — OXYCODONE 5 MG: 5 TABLET ORAL at 08:06

## 2022-06-30 RX ADMIN — MUPIROCIN: 20 OINTMENT TOPICAL at 08:06

## 2022-06-30 RX ADMIN — PROMETHAZINE HYDROCHLORIDE 12.5 MG: 12.5 TABLET ORAL at 07:06

## 2022-06-30 RX ADMIN — ONDANSETRON 4 MG: 2 INJECTION INTRAMUSCULAR; INTRAVENOUS at 08:06

## 2022-06-30 RX ADMIN — OXYCODONE 5 MG: 5 TABLET ORAL at 06:06

## 2022-06-30 RX ADMIN — IBUPROFEN 800 MG: 400 TABLET, FILM COATED ORAL at 02:06

## 2022-06-30 RX ADMIN — OXYCODONE 5 MG: 5 TABLET ORAL at 04:06

## 2022-06-30 RX ADMIN — ONDANSETRON 4 MG: 2 INJECTION INTRAMUSCULAR; INTRAVENOUS at 05:06

## 2022-06-30 RX ADMIN — MORPHINE SULFATE 2 MG: 2 INJECTION, SOLUTION INTRAMUSCULAR; INTRAVENOUS at 09:06

## 2022-06-30 RX ADMIN — BUPROPION HYDROCHLORIDE 150 MG: 150 TABLET, FILM COATED, EXTENDED RELEASE ORAL at 08:06

## 2022-06-30 RX ADMIN — GABAPENTIN 300 MG: 300 CAPSULE ORAL at 02:06

## 2022-06-30 RX ADMIN — ACETAMINOPHEN 1000 MG: 500 TABLET, FILM COATED ORAL at 06:06

## 2022-06-30 RX ADMIN — OXYCODONE 5 MG: 5 TABLET ORAL at 11:06

## 2022-06-30 NOTE — PROGRESS NOTES
Surgery Inpatient Progress Note    Date: 06/30/2022    Overnight events: nauseated yesterday, but passing flatus and had a bowel movement this morning     O:   Vitals:    06/30/22 0700   BP:    Pulse: (!) 54   Resp:    Temp:        Physical Exam   Gen: well developed female, NAD  HEENT: normocephalic, atraumatic, PERRL, EOMI   CV: RRR, no murmurs  Resp: nonlabored, CTAB   Abd: soft, incisions well approximated without erythema or drainage   MSK: no gross deformities, no cyanosis or edema       Assessment and plan:   Yaritza Shaw is a 42 y.o. female who is POD 2 s/p robotic sigmoid colectomy for diverticulitis     - Patient passing flatus and having bowel function  - working on pain control   - OOB, ambulating   - Dispo pending resolution of nausea       Trinidad Becerra MD  Staff Surgeon   Colon & Rectal Surgery

## 2022-06-30 NOTE — PLAN OF CARE
Discussed Plan of care with patient. Verbalizes understanding. Patient is AAO x4. No falls, accidents, or traumas at this time. Cardiac Monitor discontinued per MD order. Patient is on room air. IV site clean, dry, and intact. Lap sites intact to abdomen with derma bond. Pain managed per MD orders. Bed is in lowest position, SR up x2, Call light within reach. Will continue to monitor.     Problem: Adult Inpatient Plan of Care  Goal: Plan of Care Review  Outcome: Ongoing, Progressing  Goal: Patient-Specific Goal (Individualized)  Outcome: Ongoing, Progressing  Goal: Absence of Hospital-Acquired Illness or Injury  Outcome: Ongoing, Progressing  Goal: Optimal Comfort and Wellbeing  Outcome: Ongoing, Progressing  Goal: Readiness for Transition of Care  Outcome: Ongoing, Progressing     Problem: Infection  Goal: Absence of Infection Signs and Symptoms  Outcome: Ongoing, Progressing     Problem: Fall Injury Risk  Goal: Absence of Fall and Fall-Related Injury  Outcome: Ongoing, Progressing     Problem: Pain Acute  Goal: Acceptable Pain Control and Functional Ability  Outcome: Ongoing, Progressing     Problem: Nausea and Vomiting  Goal: Fluid and Electrolyte Balance  Outcome: Ongoing, Progressing

## 2022-06-30 NOTE — PT/OT/SLP PROGRESS
Physical Therapy Treatment    Patient Name:  Yaritza Shaw   MRN:  [id number]    Recommendations:     Discharge Recommendations:  home health PT   Discharge Equipment Recommendations: none   Barriers to discharge: Increased mob and transfer needs    Assessment:     Yaritza Shaw is a 42 y.o. female admitted with a medical diagnosis of Diverticulitis of large intestine without perforation or abscess without bleeding.  She presents with the following impairments/functional limitations:  weakness, impaired endurance, impaired functional mobilty, gait instability, impaired balance, decreased ROM, impaired skin, decreased lower extremity function . Upon arrival, pt was elevated HOB on room air. Boyfriend present w/in room. Pt was SBA sup<>sit, and SBA-CGA sit<>stand w/o AD. Pt amb 75ft w/o AD, decreased step length, decreased velocity, increased fear of falling, and increased LOB on right side.    Rehab Prognosis: Good; patient would benefit from acute skilled PT services to address these deficits and reach maximum level of function.    Recent Surgery: Procedure(s) (LRB):  XI ROBOTIC  COLECTOMY (N/A) 2 Days Post-Op    Plan:     During this hospitalization, patient to be seen 3 x/week to address the identified rehab impairments via gait training, therapeutic activities, therapeutic exercises, neuromuscular re-education and progress toward the following goals:    · Plan of Care Expires:  07/29/22    Subjective     Chief Complaint: pain in abdomen w/ mvmt  Patient/Family Comments/goals: Return home  Pain/Comfort:  · Pain Rating 1: other (see comments) (did not rate)  · Location - Orientation 1: generalized  · Location 1: abdomen  · Pain Addressed 1: Pre-medicate for activity, Reposition, Cessation of Activity      Objective:     Communicated with Lizbeth RN prior to session.  Patient found HOB elevated with peripheral IV, telemetry upon PT entry to room.     General Precautions: Standard, fall   Orthopedic  Precautions:N/A   Braces: N/A  Respiratory Status: Room air     Functional Mobility:  · Bed Mobility:     · Supine to Sit: stand by assistance  · Sit to Supine: stand by assistance  · Transfers:     · Sit to Stand:  stand by assistance and contact guard assistance with no AD  · Gait: 75ft w/o AD,  · Balance: Good static and fair+ dynamic      AM-PAC 6 CLICK MOBILITY  Turning over in bed (including adjusting bedclothes, sheets and blankets)?: 4  Sitting down on and standing up from a chair with arms (e.g., wheelchair, bedside commode, etc.): 3  Moving from lying on back to sitting on the side of the bed?: 3  Moving to and from a bed to a chair (including a wheelchair)?: 3  Need to walk in hospital room?: 3  Climbing 3-5 steps with a railing?: 3  Basic Mobility Total Score: 19       Therapeutic Activities and Exercises:   importance of mob, safety awareness, gait training for increased endurance and functional mob    Patient left HOB elevated with all lines intact, call button in reach and Boyfriend present..    GOALS:   Multidisciplinary Problems     Physical Therapy Goals        Problem: Physical Therapy    Goal Priority Disciplines Outcome Goal Variances Interventions   Physical Therapy Goal     PT, PT/OT Ongoing, Progressing     Description: Goals to be met by: 7/29/22    Patient will perform the following to increase strength, improve mobility, and return to prior level of function:    1. Supine <> sit with SPV.  2. Sit<>stand with modified independence with least restrictive assistive device.  3. Gait x 150 feet with modified independence with least restrictive assistive device.                       Time Tracking:     PT Received On: 06/30/22  PT Start Time: 1210     PT Stop Time: 1229  PT Total Time (min): 19 min     Billable Minutes: Gait Training 19    Treatment Type: Treatment  PT/PTA: PT     PTA Visit Number: 0     06/30/2022

## 2022-06-30 NOTE — CARE UPDATE
06/29/22 2014   PRE-TX-O2   O2 Device (Oxygen Therapy) nasal cannula   $ Is the patient on Low Flow Oxygen? Yes   Flow (L/min) 1   SpO2 95 %   Pulse Oximetry Type Intermittent   Inhaler   $ Inhaler Charges PRN treatment not required

## 2022-06-30 NOTE — PLAN OF CARE
Problem: Occupational Therapy  Goal: Occupational Therapy Goal  Description: Goals to be met by: 7/2/22; Goals abandoned due to pt returning to being able to perform LB self care without need for AE on 6/30/22    Patient will increase functional independence with ADLs by performing:    Pt will be Indep with use of adaptive equipment to progress to Mod I level with LB self care tasks.    Outcome: Met

## 2022-06-30 NOTE — PT/OT/SLP DISCHARGE
Occupational Therapy Discharge Summary    Yaritza Shaw  MRN: [id number]   Principal Problem: Diverticulitis of large intestine without perforation or abscess without bleeding      Patient Discharged from acute Occupational Therapy on 6/30/22.  Please refer to prior OT note dated 6/29/22 for functional status.    Assessment:      Pt able to return to Mod I level with LB self care without need for training in use of adaptive equipment.      Objective:     GOALS:   Multidisciplinary Problems     Occupational Therapy Goals     Not on file          Multidisciplinary Problems (Resolved)        Problem: Occupational Therapy    Goal Priority Disciplines Outcome Interventions   Occupational Therapy Goal   (Resolved)     OT, PT/OT Met    Description: Goals to be met by: 7/2/22; Goals abandoned due to pt returning to being able to perform LB self care without need for AE on 6/30/22    Patient will increase functional independence with ADLs by performing:    Pt will be Indep with use of adaptive equipment to progress to Mod I level with LB self care tasks.                     Reasons for Discontinuation of Therapy Services  Satisfactory goal achievement.      Plan:     Patient Discharged to: Pt remains in hospital for medical treatment.    6/30/2022

## 2022-06-30 NOTE — PLAN OF CARE
06/30/22 1408   Discharge Assessment   Assessment Type Discharge Planning Assessment   Confirmed/corrected address, phone number and insurance Yes   Confirmed Demographics Correct on Facesheet   Source of Information patient   Does patient/caregiver understand observation status Yes   Communicated CAYETANO with patient/caregiver Yes;Date not available/Unable to determine   Lives With significant other   Do you expect to return to your current living situation? Yes   Prior to hospitilization cognitive status: Alert/Oriented   Current cognitive status: Alert/Oriented   Dressing/Bathing Difficulty none   Readmission within 30 days? No   Patient currently being followed by outpatient case management? No   Do you currently have service(s) that help you manage your care at home? No   Do you take prescription medications? Yes   Who is going to help you get home at discharge? Friend will transport home.   Are you on dialysis? No   Do you take coumadin? No   Discharge Plan A Home with family   Discharge Plan discussed with: Friend   Discharge Barriers Identified None

## 2022-06-30 NOTE — PLAN OF CARE
VSS and afebrile, AAOx4. Incisions CDI. Pain/Nausea controlled with PRN medications. Family at bedside. Plan of care reviewed with patient. Purposeful rounding done, call light at bed side, bed at lowest position, brakes on, non-skid socks on, will continue to monitor.     Problem: Adult Inpatient Plan of Care  Goal: Optimal Comfort and Wellbeing  6/30/2022 0558 by Choco Perez RN  Outcome: Ongoing, Progressing  6/30/2022 0557 by Choco Perez RN  Outcome: Ongoing, Progressing     Problem: Infection  Goal: Absence of Infection Signs and Symptoms  Outcome: Ongoing, Progressing     Problem: Fall Injury Risk  Goal: Absence of Fall and Fall-Related Injury  Outcome: Ongoing, Progressing     Problem: Pain Acute  Goal: Acceptable Pain Control and Functional Ability  Outcome: Ongoing, Progressing     Problem: Nausea and Vomiting  Goal: Fluid and Electrolyte Balance  Outcome: Ongoing, Progressing

## 2022-07-01 PROBLEM — K57.92 DIVERTICULITIS: Status: RESOLVED | Noted: 2022-07-01 | Resolved: 2022-07-01

## 2022-07-01 PROBLEM — K57.92 DIVERTICULITIS: Status: ACTIVE | Noted: 2022-07-01

## 2022-07-01 LAB — CRP SERPL-MCNC: 17.3 MG/L (ref 0–8.2)

## 2022-07-01 PROCEDURE — 11000001 HC ACUTE MED/SURG PRIVATE ROOM

## 2022-07-01 PROCEDURE — 25000003 PHARM REV CODE 250: Performed by: SURGERY

## 2022-07-01 PROCEDURE — 86140 C-REACTIVE PROTEIN: CPT | Performed by: SURGERY

## 2022-07-01 PROCEDURE — 36415 COLL VENOUS BLD VENIPUNCTURE: CPT | Performed by: SURGERY

## 2022-07-01 PROCEDURE — 99900035 HC TECH TIME PER 15 MIN (STAT)

## 2022-07-01 PROCEDURE — 94761 N-INVAS EAR/PLS OXIMETRY MLT: CPT

## 2022-07-01 PROCEDURE — 63600175 PHARM REV CODE 636 W HCPCS: Performed by: SURGERY

## 2022-07-01 PROCEDURE — 97116 GAIT TRAINING THERAPY: CPT | Mod: CQ

## 2022-07-01 RX ORDER — PROMETHAZINE HYDROCHLORIDE 12.5 MG/1
12.5 TABLET ORAL EVERY 6 HOURS PRN
Qty: 15 TABLET | Refills: 0 | Status: SHIPPED | OUTPATIENT
Start: 2022-07-01

## 2022-07-01 RX ORDER — OXYCODONE HYDROCHLORIDE 5 MG/1
5 TABLET ORAL EVERY 4 HOURS PRN
Qty: 40 TABLET | Refills: 0 | Status: SHIPPED | OUTPATIENT
Start: 2022-07-01

## 2022-07-01 RX ORDER — OXYCODONE HYDROCHLORIDE 5 MG/1
10 TABLET ORAL EVERY 4 HOURS PRN
Status: DISCONTINUED | OUTPATIENT
Start: 2022-07-01 | End: 2022-07-05 | Stop reason: HOSPADM

## 2022-07-01 RX ADMIN — GABAPENTIN 300 MG: 300 CAPSULE ORAL at 09:07

## 2022-07-01 RX ADMIN — MORPHINE SULFATE 2 MG: 2 INJECTION, SOLUTION INTRAMUSCULAR; INTRAVENOUS at 04:07

## 2022-07-01 RX ADMIN — MORPHINE SULFATE 2 MG: 2 INJECTION, SOLUTION INTRAMUSCULAR; INTRAVENOUS at 02:07

## 2022-07-01 RX ADMIN — IBUPROFEN 800 MG: 400 TABLET, FILM COATED ORAL at 09:07

## 2022-07-01 RX ADMIN — MUPIROCIN: 20 OINTMENT TOPICAL at 08:07

## 2022-07-01 RX ADMIN — OXYCODONE 5 MG: 5 TABLET ORAL at 01:07

## 2022-07-01 RX ADMIN — BUPROPION HYDROCHLORIDE 150 MG: 150 TABLET, FILM COATED, EXTENDED RELEASE ORAL at 09:07

## 2022-07-01 RX ADMIN — ACETAMINOPHEN 1000 MG: 500 TABLET, FILM COATED ORAL at 09:07

## 2022-07-01 RX ADMIN — MUPIROCIN: 20 OINTMENT TOPICAL at 09:07

## 2022-07-01 RX ADMIN — PROMETHAZINE HYDROCHLORIDE 12.5 MG: 12.5 TABLET ORAL at 04:07

## 2022-07-01 RX ADMIN — PROMETHAZINE HYDROCHLORIDE 12.5 MG: 12.5 TABLET ORAL at 02:07

## 2022-07-01 RX ADMIN — ACETAMINOPHEN 1000 MG: 500 TABLET, FILM COATED ORAL at 01:07

## 2022-07-01 RX ADMIN — ONDANSETRON 4 MG: 2 INJECTION INTRAMUSCULAR; INTRAVENOUS at 06:07

## 2022-07-01 RX ADMIN — FAMOTIDINE 20 MG: 20 TABLET ORAL at 08:07

## 2022-07-01 RX ADMIN — IBUPROFEN 800 MG: 400 TABLET, FILM COATED ORAL at 01:07

## 2022-07-01 RX ADMIN — ONDANSETRON 4 MG: 2 INJECTION INTRAMUSCULAR; INTRAVENOUS at 09:07

## 2022-07-01 RX ADMIN — GABAPENTIN 300 MG: 300 CAPSULE ORAL at 02:07

## 2022-07-01 RX ADMIN — ONDANSETRON 4 MG: 2 INJECTION INTRAMUSCULAR; INTRAVENOUS at 01:07

## 2022-07-01 RX ADMIN — MORPHINE SULFATE 2 MG: 2 INJECTION, SOLUTION INTRAMUSCULAR; INTRAVENOUS at 08:07

## 2022-07-01 RX ADMIN — OXYCODONE 10 MG: 5 TABLET ORAL at 06:07

## 2022-07-01 RX ADMIN — TIZANIDINE 4 MG: 2 TABLET ORAL at 08:07

## 2022-07-01 RX ADMIN — VENLAFAXINE HYDROCHLORIDE 75 MG: 75 CAPSULE, EXTENDED RELEASE ORAL at 09:07

## 2022-07-01 RX ADMIN — OXYCODONE 10 MG: 5 TABLET ORAL at 11:07

## 2022-07-01 RX ADMIN — FAMOTIDINE 20 MG: 20 TABLET ORAL at 09:07

## 2022-07-01 RX ADMIN — PROMETHAZINE HYDROCHLORIDE 12.5 MG: 12.5 TABLET ORAL at 11:07

## 2022-07-01 RX ADMIN — GABAPENTIN 300 MG: 300 CAPSULE ORAL at 08:07

## 2022-07-01 RX ADMIN — ACETAMINOPHEN 1000 MG: 500 TABLET, FILM COATED ORAL at 05:07

## 2022-07-01 RX ADMIN — OXYCODONE 10 MG: 5 TABLET ORAL at 09:07

## 2022-07-01 RX ADMIN — IBUPROFEN 800 MG: 400 TABLET, FILM COATED ORAL at 05:07

## 2022-07-01 NOTE — PROGRESS NOTES
Surgery Inpatient Progress Note    Date: 07/01/2022    Overnight events: Passing flatus and having bowel function.  Still having nausea and vomiting, improved with phenergan.  Patient walked with PT yesterday.     O:   Vitals:    07/01/22 0732   BP: (!) 99/57   Pulse: 74   Resp: 16   Temp: 98 °F (36.7 °C)       Physical Exam   Gen: well developed female, NAD  HEENT: normocephalic, atraumatic, PERRL, EOMI   CV: RRR, no murmurs  Resp: nonlabored, CTAB   Abd: soft, incisions well approximated without erythema or drainage   MSK: no gross deformities     Assessment and plan:   Yaritza Shaw is a 42 y.o. female who is POD 3 s/p robotic sigmoid colectomy     - Patient passing flatus and having bowel function, persistent nausea and vomiting.   - ambulate, OOB  - pain control   - possible DC over weekend.       Trinidad Becerra MD  Staff Surgeon   Colon & Rectal Surgery

## 2022-07-01 NOTE — PT/OT/SLP PROGRESS
Physical Therapy Treatment    Patient Name:  Yaritza Shaw   MRN:  [id number]    Recommendations:     Discharge Recommendations:  home health PT   Discharge Equipment Recommendations: none   Barriers to discharge: None    Assessment:     Yaritza Shaw is a 42 y.o. female admitted with a medical diagnosis of Diverticulitis of large intestine without perforation or abscess without bleeding.  She presents with the following impairments/functional limitations:  weakness, impaired endurance, impaired functional mobilty, pain, impaired skin , pt with improved mobility today, inc amb distance, NO LOB or SOB noted. Pt reported she will not have any trouble going up a few steps into home. Pt inst'd to walk over the weekend w/ 's assistance.    Rehab Prognosis: Good; patient would benefit from acute skilled PT services to address these deficits and reach maximum level of function.    Recent Surgery: Procedure(s) (LRB):  XI ROBOTIC  COLECTOMY (N/A) 3 Days Post-Op    Plan:     During this hospitalization, patient to be seen 3 x/week to address the identified rehab impairments via gait training, therapeutic activities, therapeutic exercises, neuromuscular re-education and progress toward the following goals:    · Plan of Care Expires:  07/29/22    Subjective     Chief Complaint: nausea  Patient/Family Comments/goals: pt agreeable to session, boyfriend present as well.  Pain/Comfort:  · Pain Rating 1: 7/10 (at rest)  · Location - Side 1: Right  · Location - Orientation 1: generalized  · Location 1: abdomen  · Pain Addressed 1: Reposition, Distraction, Nurse notified  · Pain Rating Post-Intervention 1: 8/10 (with amb)      Objective:     Communicated with nurse prior to session.  Patient found HOB elevated with peripheral IV, oxygen upon PT entry to room.     General Precautions: Standard, fall   Orthopedic Precautions:N/A   Braces: N/A  Respiratory Status: Nasal cannula, flow 2 L/min     Functional Mobility:  · Bed  Mobility:     · Supine to Sit: stand by assistance and with HOB elevated and pt using bedrail (reports she is going to sleep in a recliner she just purchased)  · Transfers:     · Sit to Stand:  supervision and stand by assistance with no AD  · Gait: amb'd ~150' w/ SBA/Superv.      AM-PAC 6 CLICK MOBILITY  Turning over in bed (including adjusting bedclothes, sheets and blankets)?: 4  Sitting down on and standing up from a chair with arms (e.g., wheelchair, bedside commode, etc.): 3  Moving from lying on back to sitting on the side of the bed?: 3  Moving to and from a bed to a chair (including a wheelchair)?: 3  Need to walk in hospital room?: 3  Climbing 3-5 steps with a railing?: 3  Basic Mobility Total Score: 19       Therapeutic Activities and Exercises:   inst'd in LE AP's, LAQ's in chair    Patient left up in chair with all lines intact, call button in reach, nurse notified and  present..    GOALS:   Multidisciplinary Problems     Physical Therapy Goals        Problem: Physical Therapy    Goal Priority Disciplines Outcome Goal Variances Interventions   Physical Therapy Goal     PT, PT/OT Ongoing, Progressing     Description: Goals to be met by: 7/29/22    Patient will perform the following to increase strength, improve mobility, and return to prior level of function:    1. Supine <> sit with SPV.  2. Sit<>stand with modified independence with least restrictive assistive device.  3. Gait x 150 feet with modified independence with least restrictive assistive device.                       Time Tracking:     PT Received On: 07/01/22  PT Start Time: 0829     PT Stop Time: 0841  PT Total Time (min): 12 min     Billable Minutes: Gait Training 12    Treatment Type: Treatment  PT/PTA: PTA     PTA Visit Number: 1 07/01/2022

## 2022-07-01 NOTE — PLAN OF CARE
Problem: Adult Inpatient Plan of Care  Goal: Plan of Care Review  Outcome: Ongoing, Progressing  Goal: Patient-Specific Goal (Individualized)  Outcome: Ongoing, Progressing  Goal: Absence of Hospital-Acquired Illness or Injury  Outcome: Ongoing, Progressing  Goal: Optimal Comfort and Wellbeing  Outcome: Ongoing, Progressing  Goal: Readiness for Transition of Care  Outcome: Ongoing, Progressing     Problem: Infection  Goal: Absence of Infection Signs and Symptoms  Outcome: Ongoing, Progressing     Problem: Fall Injury Risk  Goal: Absence of Fall and Fall-Related Injury  Outcome: Ongoing, Progressing     Problem: Pain Acute  Goal: Acceptable Pain Control and Functional Ability  Outcome: Ongoing, Progressing     Problem: Nausea and Vomiting  Goal: Fluid and Electrolyte Balance  Outcome: Ongoing, Progressing

## 2022-07-01 NOTE — DISCHARGE INSTRUCTIONS
Take tylenol and ibuprofen for pain.  Take pain medication for breakthrough pain only.    If no bowel movement in 24-48 hours, take capful of miralax daily until bowel movement   Okay to shower, do not soak in tub or swim for 2 weeks  No heavy lifting for 4 weeks  Call [phone] fever > 101, redness or swelling from incisions, persistent nausea/vomiting.    Follow up in 2 weeks

## 2022-07-01 NOTE — HOSPITAL COURSE
Ms. Shaw presented for robotic sigmoid colectomy for diverticular disease.  Post-operatively she was admitted to the floor.  She had return of bowel function, was able to tolerate a diet, and ambulate.  Her steve was removed and she was able to void.  She was deemed appropriate for discharge.

## 2022-07-02 LAB — CRP SERPL-MCNC: 21.7 MG/L (ref 0–8.2)

## 2022-07-02 PROCEDURE — 63600175 PHARM REV CODE 636 W HCPCS: Performed by: STUDENT IN AN ORGANIZED HEALTH CARE EDUCATION/TRAINING PROGRAM

## 2022-07-02 PROCEDURE — 25000003 PHARM REV CODE 250: Performed by: SURGERY

## 2022-07-02 PROCEDURE — 94761 N-INVAS EAR/PLS OXIMETRY MLT: CPT

## 2022-07-02 PROCEDURE — 94799 UNLISTED PULMONARY SVC/PX: CPT

## 2022-07-02 PROCEDURE — 99232 SBSQ HOSP IP/OBS MODERATE 35: CPT | Mod: ,,, | Performed by: STUDENT IN AN ORGANIZED HEALTH CARE EDUCATION/TRAINING PROGRAM

## 2022-07-02 PROCEDURE — 99232 PR SUBSEQUENT HOSPITAL CARE,LEVL II: ICD-10-PCS | Mod: ,,, | Performed by: STUDENT IN AN ORGANIZED HEALTH CARE EDUCATION/TRAINING PROGRAM

## 2022-07-02 PROCEDURE — 11000001 HC ACUTE MED/SURG PRIVATE ROOM

## 2022-07-02 PROCEDURE — 86140 C-REACTIVE PROTEIN: CPT | Performed by: SURGERY

## 2022-07-02 PROCEDURE — 63600175 PHARM REV CODE 636 W HCPCS: Performed by: SURGERY

## 2022-07-02 PROCEDURE — 36415 COLL VENOUS BLD VENIPUNCTURE: CPT | Performed by: SURGERY

## 2022-07-02 PROCEDURE — 27000221 HC OXYGEN, UP TO 24 HOURS

## 2022-07-02 PROCEDURE — 99900035 HC TECH TIME PER 15 MIN (STAT)

## 2022-07-02 RX ORDER — SODIUM CHLORIDE, SODIUM LACTATE, POTASSIUM CHLORIDE, CALCIUM CHLORIDE 600; 310; 30; 20 MG/100ML; MG/100ML; MG/100ML; MG/100ML
INJECTION, SOLUTION INTRAVENOUS CONTINUOUS
Status: DISCONTINUED | OUTPATIENT
Start: 2022-07-02 | End: 2022-07-04

## 2022-07-02 RX ADMIN — ACETAMINOPHEN 1000 MG: 500 TABLET, FILM COATED ORAL at 02:07

## 2022-07-02 RX ADMIN — BUPROPION HYDROCHLORIDE 150 MG: 150 TABLET, FILM COATED, EXTENDED RELEASE ORAL at 08:07

## 2022-07-02 RX ADMIN — OXYCODONE 10 MG: 5 TABLET ORAL at 08:07

## 2022-07-02 RX ADMIN — SODIUM CHLORIDE, SODIUM LACTATE, POTASSIUM CHLORIDE, AND CALCIUM CHLORIDE: .6; .31; .03; .02 INJECTION, SOLUTION INTRAVENOUS at 07:07

## 2022-07-02 RX ADMIN — MORPHINE SULFATE 2 MG: 2 INJECTION, SOLUTION INTRAMUSCULAR; INTRAVENOUS at 06:07

## 2022-07-02 RX ADMIN — IBUPROFEN 800 MG: 400 TABLET, FILM COATED ORAL at 05:07

## 2022-07-02 RX ADMIN — OXYCODONE 10 MG: 5 TABLET ORAL at 04:07

## 2022-07-02 RX ADMIN — PROMETHAZINE HYDROCHLORIDE 12.5 MG: 12.5 TABLET ORAL at 02:07

## 2022-07-02 RX ADMIN — PROMETHAZINE HYDROCHLORIDE 12.5 MG: 12.5 TABLET ORAL at 08:07

## 2022-07-02 RX ADMIN — MORPHINE SULFATE 2 MG: 2 INJECTION, SOLUTION INTRAMUSCULAR; INTRAVENOUS at 11:07

## 2022-07-02 RX ADMIN — SODIUM CHLORIDE, SODIUM LACTATE, POTASSIUM CHLORIDE, AND CALCIUM CHLORIDE 500 ML: .6; .31; .03; .02 INJECTION, SOLUTION INTRAVENOUS at 11:07

## 2022-07-02 RX ADMIN — IBUPROFEN 800 MG: 400 TABLET, FILM COATED ORAL at 09:07

## 2022-07-02 RX ADMIN — VENLAFAXINE HYDROCHLORIDE 75 MG: 75 CAPSULE, EXTENDED RELEASE ORAL at 08:07

## 2022-07-02 RX ADMIN — MORPHINE SULFATE 2 MG: 2 INJECTION, SOLUTION INTRAMUSCULAR; INTRAVENOUS at 04:07

## 2022-07-02 RX ADMIN — FAMOTIDINE 20 MG: 20 TABLET ORAL at 08:07

## 2022-07-02 RX ADMIN — MUPIROCIN: 20 OINTMENT TOPICAL at 08:07

## 2022-07-02 RX ADMIN — GABAPENTIN 300 MG: 300 CAPSULE ORAL at 08:07

## 2022-07-02 RX ADMIN — GABAPENTIN 300 MG: 300 CAPSULE ORAL at 02:07

## 2022-07-02 RX ADMIN — SODIUM CHLORIDE, SODIUM LACTATE, POTASSIUM CHLORIDE, AND CALCIUM CHLORIDE: .6; .31; .03; .02 INJECTION, SOLUTION INTRAVENOUS at 12:07

## 2022-07-02 RX ADMIN — IBUPROFEN 800 MG: 400 TABLET, FILM COATED ORAL at 02:07

## 2022-07-02 RX ADMIN — TIZANIDINE 4 MG: 2 TABLET ORAL at 09:07

## 2022-07-02 RX ADMIN — ONDANSETRON 4 MG: 2 INJECTION INTRAMUSCULAR; INTRAVENOUS at 04:07

## 2022-07-02 RX ADMIN — ACETAMINOPHEN 1000 MG: 500 TABLET, FILM COATED ORAL at 05:07

## 2022-07-02 NOTE — PLAN OF CARE
Pt resting comfortably in bed with family at bedside. Vital signs have been stable today, pain better controlled but pt still presenting with ongoing nausea and vomiting. MD aware. Ambulating independently in room. Nonskid socks on, fall precautions in place.   Problem: Adult Inpatient Plan of Care  Goal: Plan of Care Review  Outcome: Ongoing, Progressing  Goal: Patient-Specific Goal (Individualized)  Outcome: Ongoing, Progressing  Goal: Absence of Hospital-Acquired Illness or Injury  Outcome: Ongoing, Progressing  Goal: Optimal Comfort and Wellbeing  Outcome: Ongoing, Progressing  Goal: Readiness for Transition of Care  Outcome: Ongoing, Progressing     Problem: Pain Acute  Goal: Acceptable Pain Control and Functional Ability  Outcome: Ongoing, Progressing     Problem: Nausea and Vomiting  Goal: Fluid and Electrolyte Balance  Outcome: Ongoing, Progressing

## 2022-07-02 NOTE — PROGRESS NOTES
"Surgery Inpatient Progress Note    Date: 07/02/2022    Subjective:   4 Days Post-Op  Nauseous and vomiting, tried breakfast and vomited it back up.  Has been able to tolerate minimal PO.  Reports distention, passing minimal flatus.    Objective:   BP (!) 103/56 (BP Location: Right arm, Patient Position: Sitting)   Pulse 69   Temp 97.6 °F (36.4 °C) (Oral)   Resp 18   Ht 5' 2" (1.575 m)   Wt 75.8 kg (167 lb 3.2 oz)   SpO2 (!) 94%   Breastfeeding No   BMI 30.58 kg/m²     Physical Exam  Constitutional:       General: She is not in acute distress.  HENT:      Mouth/Throat:      Mouth: Mucous membranes are dry.   Cardiovascular:      Rate and Rhythm: Normal rate.   Pulmonary:      Effort: Pulmonary effort is normal. No respiratory distress.   Abdominal:      General: There is distension.      Palpations: Abdomen is soft.      Tenderness: There is no abdominal tenderness.      Comments: Incisions intact, no drainage or erythema   Skin:     General: Skin is warm and dry.   Neurological:      Mental Status: She is alert.   Psychiatric:         Mood and Affect: Mood normal.         Behavior: Behavior normal.          Assessment and Plan:   Yaritza Shaw is a 42 y.o. female with a history of diverticulitis now POD 4 s/p robotic sigmoid colectomy, ileus persists.     - Ileus persists, continues to have nausea vomiting  - Encourage OOB/ ambulation  - 500mL crystalloid bolus now, then gentle IV fluids at 50mL/hr until tolerating PO  - Antiemetics PRN  - Strict I/O  - Pain control as needed  - Discharge pending resolution of ileus    Janey Nova MD  Endocrine Surgery & General Surgery    "

## 2022-07-02 NOTE — PLAN OF CARE
Patient on RA with sats as documented. Pt on prn therapy and doesn't require respiratory tx at this time.   Pt doing IS @  1500 L. Will cont to encourage deep breathing and coughing. No apparent respiratory distress noted and will cont to monitor

## 2022-07-02 NOTE — PLAN OF CARE
Plan of care reviewed with patient. Pt AAOx4. 2L/NC applied overnight to increase Spo2 to 96%. Pt remains free from fall, injury, and skin breakdown. Positions self independently. Pt pain controlled with current pain regimen. PRN medication given for intermittent nausea and effective. Tolerating ordered diet. Voiding spontaneously.  Purposeful hourly rounding done. Safety maintained.     Problem: Adult Inpatient Plan of Care  Goal: Plan of Care Review  Outcome: Ongoing, Progressing     Problem: Pain Acute  Goal: Acceptable Pain Control and Functional Ability  Outcome: Ongoing, Progressing     Problem: Nausea and Vomiting  Goal: Fluid and Electrolyte Balance  Outcome: Ongoing, Progressing     Problem: Fall Injury Risk  Goal: Absence of Fall and Fall-Related Injury  Outcome: Ongoing, Progressing     Problem: Infection  Goal: Absence of Infection Signs and Symptoms  Outcome: Ongoing, Progressing

## 2022-07-03 LAB — CRP SERPL-MCNC: 21.4 MG/L (ref 0–8.2)

## 2022-07-03 PROCEDURE — 63600175 PHARM REV CODE 636 W HCPCS: Performed by: SURGERY

## 2022-07-03 PROCEDURE — 27000221 HC OXYGEN, UP TO 24 HOURS

## 2022-07-03 PROCEDURE — 11000001 HC ACUTE MED/SURG PRIVATE ROOM

## 2022-07-03 PROCEDURE — 99232 PR SUBSEQUENT HOSPITAL CARE,LEVL II: ICD-10-PCS | Mod: GT,,, | Performed by: STUDENT IN AN ORGANIZED HEALTH CARE EDUCATION/TRAINING PROGRAM

## 2022-07-03 PROCEDURE — 25000003 PHARM REV CODE 250: Performed by: SURGERY

## 2022-07-03 PROCEDURE — 94761 N-INVAS EAR/PLS OXIMETRY MLT: CPT

## 2022-07-03 PROCEDURE — 99900035 HC TECH TIME PER 15 MIN (STAT)

## 2022-07-03 PROCEDURE — 86140 C-REACTIVE PROTEIN: CPT | Performed by: SURGERY

## 2022-07-03 PROCEDURE — 99232 SBSQ HOSP IP/OBS MODERATE 35: CPT | Mod: GT,,, | Performed by: STUDENT IN AN ORGANIZED HEALTH CARE EDUCATION/TRAINING PROGRAM

## 2022-07-03 PROCEDURE — 25000242 PHARM REV CODE 250 ALT 637 W/ HCPCS: Performed by: SURGERY

## 2022-07-03 PROCEDURE — 36415 COLL VENOUS BLD VENIPUNCTURE: CPT | Performed by: SURGERY

## 2022-07-03 PROCEDURE — 94640 AIRWAY INHALATION TREATMENT: CPT

## 2022-07-03 RX ADMIN — OXYCODONE 10 MG: 5 TABLET ORAL at 12:07

## 2022-07-03 RX ADMIN — IBUPROFEN 800 MG: 400 TABLET, FILM COATED ORAL at 05:07

## 2022-07-03 RX ADMIN — ALBUTEROL SULFATE 2 PUFF: 90 AEROSOL, METERED RESPIRATORY (INHALATION) at 01:07

## 2022-07-03 RX ADMIN — ACETAMINOPHEN 1000 MG: 500 TABLET, FILM COATED ORAL at 05:07

## 2022-07-03 RX ADMIN — MORPHINE SULFATE 2 MG: 2 INJECTION, SOLUTION INTRAMUSCULAR; INTRAVENOUS at 12:07

## 2022-07-03 RX ADMIN — VENLAFAXINE HYDROCHLORIDE 75 MG: 75 CAPSULE, EXTENDED RELEASE ORAL at 08:07

## 2022-07-03 RX ADMIN — MORPHINE SULFATE 2 MG: 2 INJECTION, SOLUTION INTRAMUSCULAR; INTRAVENOUS at 08:07

## 2022-07-03 RX ADMIN — BUPROPION HYDROCHLORIDE 150 MG: 150 TABLET, FILM COATED, EXTENDED RELEASE ORAL at 08:07

## 2022-07-03 RX ADMIN — GABAPENTIN 300 MG: 300 CAPSULE ORAL at 08:07

## 2022-07-03 RX ADMIN — OXYCODONE 10 MG: 5 TABLET ORAL at 09:07

## 2022-07-03 RX ADMIN — OXYCODONE 10 MG: 5 TABLET ORAL at 05:07

## 2022-07-03 RX ADMIN — MORPHINE SULFATE 2 MG: 2 INJECTION, SOLUTION INTRAMUSCULAR; INTRAVENOUS at 03:07

## 2022-07-03 RX ADMIN — IBUPROFEN 800 MG: 400 TABLET, FILM COATED ORAL at 02:07

## 2022-07-03 RX ADMIN — ACETAMINOPHEN 1000 MG: 500 TABLET, FILM COATED ORAL at 09:07

## 2022-07-03 RX ADMIN — FAMOTIDINE 20 MG: 20 TABLET ORAL at 08:07

## 2022-07-03 RX ADMIN — ACETAMINOPHEN 1000 MG: 500 TABLET, FILM COATED ORAL at 02:07

## 2022-07-03 RX ADMIN — GABAPENTIN 300 MG: 300 CAPSULE ORAL at 02:07

## 2022-07-03 RX ADMIN — IBUPROFEN 800 MG: 400 TABLET, FILM COATED ORAL at 09:07

## 2022-07-03 RX ADMIN — TIZANIDINE 4 MG: 2 TABLET ORAL at 08:07

## 2022-07-03 RX ADMIN — OXYCODONE 10 MG: 5 TABLET ORAL at 08:07

## 2022-07-03 NOTE — PLAN OF CARE
Plan of care reviewed with patient. Pt AAOx4. Pt remains free from fall, injury, and skin breakdown. Positions self independently. Pt pain controlled with current pain regimen. PRN medication given for intermittent nausea and effective. No vomiting overnight. Voiding spontaneously.  Purposeful hourly rounding done. Safety maintained.      Problem: Adult Inpatient Plan of Care  Goal: Plan of Care Review  Outcome: Ongoing, Progressing     Problem: Pain Acute  Goal: Acceptable Pain Control and Functional Ability  Outcome: Ongoing, Progressing     Problem: Nausea and Vomiting  Goal: Fluid and Electrolyte Balance  Outcome: Ongoing, Progressing     Problem: Fall Injury Risk  Goal: Absence of Fall and Fall-Related Injury  Outcome: Ongoing, Progressing     Problem: Infection  Goal: Absence of Infection Signs and Symptoms  Outcome: Ongoing, Progressing

## 2022-07-03 NOTE — PROGRESS NOTES
"Surgery Inpatient Progress Note    Date: 07/03/2022    Subjective:   5 Days Post-Op  Feeling better, still feels dry.  Tolerated pancakes and some liquids.  Nausea improving.  No flatus.  Ambulating the halls.    Objective:   BP (!) 99/56   Pulse 73   Temp 97.7 °F (36.5 °C) (Oral)   Resp 16   Ht 5' 2" (1.575 m)   Wt 75.8 kg (167 lb 3.2 oz)   SpO2 95%   Breastfeeding No   BMI 30.58 kg/m²     Physical Exam  Constitutional:       General: She is not in acute distress.  HENT:      Mouth/Throat:      Mouth: Mucous membranes are dry.   Cardiovascular:      Rate and Rhythm: Normal rate.   Pulmonary:      Effort: Pulmonary effort is normal. No respiratory distress.   Abdominal:      General: There is distension.      Palpations: Abdomen is soft.      Tenderness: There is no abdominal tenderness.      Comments: Incisions intact, no drainage or erythema   Skin:     General: Skin is warm and dry.   Neurological:      Mental Status: She is alert.   Psychiatric:         Mood and Affect: Mood normal.         Behavior: Behavior normal.          Assessment and Plan:   Yaritza Shaw is a 42 y.o. female with a history of diverticulitis now POD 5 s/p robotic sigmoid colectomy, ileus persists, awaiting return of bowel function.    - Encourage OOB/ ambulation  - Continue 50mL/hr until tolerating PO  - Diet as tolerated  - Antiemetics PRN  - Strict I/O  - Pain control as needed  - Discharge pending resolution of ileus    Janey Nova MD  Endocrine Surgery & General Surgery    "

## 2022-07-04 LAB — CRP SERPL-MCNC: 20.7 MG/L (ref 0–8.2)

## 2022-07-04 PROCEDURE — 25000003 PHARM REV CODE 250: Performed by: SURGERY

## 2022-07-04 PROCEDURE — 11000001 HC ACUTE MED/SURG PRIVATE ROOM

## 2022-07-04 PROCEDURE — 63600175 PHARM REV CODE 636 W HCPCS: Performed by: SURGERY

## 2022-07-04 PROCEDURE — 36415 COLL VENOUS BLD VENIPUNCTURE: CPT | Performed by: SURGERY

## 2022-07-04 PROCEDURE — 94761 N-INVAS EAR/PLS OXIMETRY MLT: CPT

## 2022-07-04 PROCEDURE — 27000221 HC OXYGEN, UP TO 24 HOURS

## 2022-07-04 PROCEDURE — 86140 C-REACTIVE PROTEIN: CPT | Performed by: SURGERY

## 2022-07-04 RX ORDER — FUROSEMIDE 10 MG/ML
20 INJECTION INTRAMUSCULAR; INTRAVENOUS ONCE
Status: COMPLETED | OUTPATIENT
Start: 2022-07-04 | End: 2022-07-04

## 2022-07-04 RX ADMIN — ACETAMINOPHEN 1000 MG: 500 TABLET, FILM COATED ORAL at 05:07

## 2022-07-04 RX ADMIN — ALBUTEROL SULFATE 2 PUFF: 90 AEROSOL, METERED RESPIRATORY (INHALATION) at 10:07

## 2022-07-04 RX ADMIN — BUPROPION HYDROCHLORIDE 150 MG: 150 TABLET, FILM COATED, EXTENDED RELEASE ORAL at 08:07

## 2022-07-04 RX ADMIN — OXYCODONE 10 MG: 5 TABLET ORAL at 10:07

## 2022-07-04 RX ADMIN — TIZANIDINE 4 MG: 2 TABLET ORAL at 09:07

## 2022-07-04 RX ADMIN — IBUPROFEN 800 MG: 400 TABLET, FILM COATED ORAL at 05:07

## 2022-07-04 RX ADMIN — FAMOTIDINE 20 MG: 20 TABLET ORAL at 08:07

## 2022-07-04 RX ADMIN — MORPHINE SULFATE 2 MG: 2 INJECTION, SOLUTION INTRAMUSCULAR; INTRAVENOUS at 02:07

## 2022-07-04 RX ADMIN — ACETAMINOPHEN 1000 MG: 500 TABLET, FILM COATED ORAL at 09:07

## 2022-07-04 RX ADMIN — OXYCODONE 10 MG: 5 TABLET ORAL at 05:07

## 2022-07-04 RX ADMIN — VENLAFAXINE HYDROCHLORIDE 75 MG: 75 CAPSULE, EXTENDED RELEASE ORAL at 08:07

## 2022-07-04 RX ADMIN — GABAPENTIN 300 MG: 300 CAPSULE ORAL at 08:07

## 2022-07-04 RX ADMIN — GABAPENTIN 300 MG: 300 CAPSULE ORAL at 02:07

## 2022-07-04 RX ADMIN — MORPHINE SULFATE 2 MG: 2 INJECTION, SOLUTION INTRAMUSCULAR; INTRAVENOUS at 12:07

## 2022-07-04 RX ADMIN — FAMOTIDINE 20 MG: 20 TABLET ORAL at 09:07

## 2022-07-04 RX ADMIN — ONDANSETRON 4 MG: 2 INJECTION INTRAMUSCULAR; INTRAVENOUS at 12:07

## 2022-07-04 RX ADMIN — GABAPENTIN 300 MG: 300 CAPSULE ORAL at 09:07

## 2022-07-04 RX ADMIN — MORPHINE SULFATE 2 MG: 2 INJECTION, SOLUTION INTRAMUSCULAR; INTRAVENOUS at 07:07

## 2022-07-04 RX ADMIN — IBUPROFEN 800 MG: 400 TABLET, FILM COATED ORAL at 02:07

## 2022-07-04 RX ADMIN — IBUPROFEN 800 MG: 400 TABLET, FILM COATED ORAL at 09:07

## 2022-07-04 RX ADMIN — FUROSEMIDE 20 MG: 10 INJECTION, SOLUTION INTRAMUSCULAR; INTRAVENOUS at 02:07

## 2022-07-04 RX ADMIN — OXYCODONE 10 MG: 5 TABLET ORAL at 03:07

## 2022-07-04 RX ADMIN — MORPHINE SULFATE 2 MG: 2 INJECTION, SOLUTION INTRAMUSCULAR; INTRAVENOUS at 05:07

## 2022-07-04 NOTE — PLAN OF CARE
Pt AAOX4 and ambulatory. No nausea or vomiting overnight. Pain managed with PRN medications. No acute distress/events noted. Lap sites clean dry and well approximated.     Problem: Adult Inpatient Plan of Care  Goal: Plan of Care Review  Outcome: Ongoing, Progressing  Goal: Readiness for Transition of Care  Outcome: Ongoing, Progressing     Problem: Infection  Goal: Absence of Infection Signs and Symptoms  Outcome: Ongoing, Progressing     Problem: Fall Injury Risk  Goal: Absence of Fall and Fall-Related Injury  Outcome: Ongoing, Progressing     Problem: Pain Acute  Goal: Acceptable Pain Control and Functional Ability  Outcome: Ongoing, Progressing     Problem: Nausea and Vomiting  Goal: Fluid and Electrolyte Balance  Outcome: Ongoing, Progressing

## 2022-07-04 NOTE — PROGRESS NOTES
Surgery Inpatient Progress Note    Date: 07/04/2022    Overnight events: No nausea or vomiting.  Passing some flatus and having bowel movements.  Having swelling in her hands and wheezing.     O:   Vitals:    07/04/22 0552   BP:    Pulse:    Resp: 18   Temp: 98.3 °F (36.8 °C)       Physical Exam   Gen: well developed female, NAD  HEENT: normocephalic, atraumatic, PERRL, EOMI   CV: RRR, no murmurs  Resp: nonlabored, CTAB   Abd: soft, incisions well approximated without erythema or drainage   MSK: no gross deformities, + edema    Assessment and plan:   Yaritza Shaw is a 42 y.o. female who is POD 6 s/p robotic sigmoid colectomy    - HLIV, discussed that fluids are likely source of swelling and wheezing.  Possible lasix tomorrow.   - regular diet as tolerated  - likely DC tomorrow.     Trinidad Becerra MD  Staff Surgeon   Colon & Rectal Surgery

## 2022-07-04 NOTE — NURSING
Received report from BILLY Mcpherson. Pt sitting in bed AAO x 4. Pt denies pain. Spouse at bedside. Bed in lowest position. Call light in reach. Bed rails up x 2. Lap sites cleaned and dry.

## 2022-07-05 ENCOUNTER — TELEPHONE (OUTPATIENT)
Dept: SURGERY | Facility: CLINIC | Age: 42
End: 2022-07-05
Payer: COMMERCIAL

## 2022-07-05 VITALS
WEIGHT: 167.19 LBS | TEMPERATURE: 98 F | BODY MASS INDEX: 30.77 KG/M2 | SYSTOLIC BLOOD PRESSURE: 111 MMHG | DIASTOLIC BLOOD PRESSURE: 59 MMHG | HEIGHT: 62 IN | HEART RATE: 84 BPM | OXYGEN SATURATION: 96 % | RESPIRATION RATE: 18 BRPM

## 2022-07-05 LAB — CRP SERPL-MCNC: 25 MG/L (ref 0–8.2)

## 2022-07-05 PROCEDURE — 86140 C-REACTIVE PROTEIN: CPT | Performed by: SURGERY

## 2022-07-05 PROCEDURE — 27000221 HC OXYGEN, UP TO 24 HOURS

## 2022-07-05 PROCEDURE — 63600175 PHARM REV CODE 636 W HCPCS: Performed by: SURGERY

## 2022-07-05 PROCEDURE — 94761 N-INVAS EAR/PLS OXIMETRY MLT: CPT

## 2022-07-05 PROCEDURE — 25000003 PHARM REV CODE 250: Performed by: SURGERY

## 2022-07-05 PROCEDURE — 36415 COLL VENOUS BLD VENIPUNCTURE: CPT | Performed by: SURGERY

## 2022-07-05 PROCEDURE — 99900035 HC TECH TIME PER 15 MIN (STAT)

## 2022-07-05 RX ORDER — FUROSEMIDE 10 MG/ML
20 INJECTION INTRAMUSCULAR; INTRAVENOUS ONCE
Status: COMPLETED | OUTPATIENT
Start: 2022-07-05 | End: 2022-07-05

## 2022-07-05 RX ADMIN — FAMOTIDINE 20 MG: 20 TABLET ORAL at 09:07

## 2022-07-05 RX ADMIN — OXYCODONE 10 MG: 5 TABLET ORAL at 09:07

## 2022-07-05 RX ADMIN — ACETAMINOPHEN 1000 MG: 500 TABLET, FILM COATED ORAL at 01:07

## 2022-07-05 RX ADMIN — IBUPROFEN 800 MG: 400 TABLET, FILM COATED ORAL at 01:07

## 2022-07-05 RX ADMIN — MORPHINE SULFATE 2 MG: 2 INJECTION, SOLUTION INTRAMUSCULAR; INTRAVENOUS at 11:07

## 2022-07-05 RX ADMIN — MORPHINE SULFATE 2 MG: 2 INJECTION, SOLUTION INTRAMUSCULAR; INTRAVENOUS at 04:07

## 2022-07-05 RX ADMIN — ONDANSETRON 4 MG: 2 INJECTION INTRAMUSCULAR; INTRAVENOUS at 04:07

## 2022-07-05 RX ADMIN — FUROSEMIDE 20 MG: 10 INJECTION, SOLUTION INTRAMUSCULAR; INTRAVENOUS at 09:07

## 2022-07-05 RX ADMIN — OXYCODONE 5 MG: 5 TABLET ORAL at 01:07

## 2022-07-05 RX ADMIN — GABAPENTIN 300 MG: 300 CAPSULE ORAL at 09:07

## 2022-07-05 RX ADMIN — BUPROPION HYDROCHLORIDE 150 MG: 150 TABLET, FILM COATED, EXTENDED RELEASE ORAL at 09:07

## 2022-07-05 RX ADMIN — IBUPROFEN 800 MG: 400 TABLET, FILM COATED ORAL at 05:07

## 2022-07-05 RX ADMIN — OXYCODONE 10 MG: 5 TABLET ORAL at 05:07

## 2022-07-05 RX ADMIN — ACETAMINOPHEN 1000 MG: 500 TABLET, FILM COATED ORAL at 05:07

## 2022-07-05 RX ADMIN — VENLAFAXINE HYDROCHLORIDE 75 MG: 75 CAPSULE, EXTENDED RELEASE ORAL at 09:07

## 2022-07-05 NOTE — DISCHARGE SUMMARY
[facility]  Colorectal Surgery  Discharge Summary      Patient Name: Yaritza Shaw  MRN: [id number]  Admission Date: 6/28/2022  Hospital Length of Stay: 7 days  Discharge Date and Time:  07/05/2022 7:50 AM  Attending Physician: Trinidad Becerra MD   Discharging Provider: Trinidad Becerra MD  Primary Care Provider: Davis Rubio MD     HPI:  No notes on file    Procedure(s) (LRB):  XI ROBOTIC  COLECTOMY (N/A)     Hospital Course:  Ms. Shaw presented for robotic sigmoid colectomy for diverticular disease.  Post-operatively she was admitted to the floor.  She had return of bowel function, was able to tolerate a diet, and ambulate.  Her steve was removed and she was able to void.  She was deemed appropriate for discharge.       Goals of Care Treatment Preferences:  Code Status: Full Code          Significant Diagnostic Studies:     Pending Diagnostic Studies:     Procedure Component Value Units Date/Time    Specimen to Pathology, Surgery General Surgery [[id number]] Collected: 06/28/22 1036    Order Status: Sent Lab Status: In process Updated: 06/28/22 1427    Specimen: Tissue         Final Active Diagnoses:    Diagnosis Date Noted POA    PRINCIPAL PROBLEM:  Diverticulitis of large intestine without perforation or abscess without bleeding [K57.32] 06/28/2022 Yes      Problems Resolved During this Admission:    Diagnosis Date Noted Date Resolved POA    Diverticulitis [K57.92] 07/01/2022 07/01/2022 Yes      Discharged Condition: stable    Disposition: Home or Self Care    Follow Up:   Follow-up Information     Trinidad Becerra MD Follow up.    Specialty: Colon and Rectal Surgery  Why: Follow up in 2 weeks for wound check  Contact information:  [address]  [facility] [address]  [phone]                       Patient Instructions:     1. Take tylenol and ibuprofen for pain.  Take pain medication for breakthrough pain only.    2. If no bowel movement in 24-48 hours, take capful of miralax  daily until bowel movement   3. Okay to shower, do not soak in tub or swim for 2 weeks  4. No heavy lifting for 4 weeks  5. Call [phone] fever > 101, redness or swelling from incisions, persistent nausea/vomiting.    6. Follow up in 2 weeks       Medications:  Reconciled Home Medications:      Medication List      START taking these medications    oxyCODONE 5 MG immediate release tablet  Commonly known as: ROXICODONE  Take 1 tablet (5 mg total) by mouth every 4 (four) hours as needed for Pain.     promethazine 12.5 MG Tab  Commonly known as: PHENERGAN  Take 1 tablet (12.5 mg total) by mouth every 6 (six) hours as needed (nausea).        CHANGE how you take these medications    buPROPion 150 MG TB24 tablet  Commonly known as: WELLBUTRIN XL  TAKE 1 TABLET BY MOUTH EVERY DAY  What changed:   · how much to take  · how to take this  · when to take this     venlafaxine 75 MG 24 hr capsule  Commonly known as: EFFEXOR-XR  TAKE 1 CAPSULE BY MOUTH EVERY DAY  What changed:   · how much to take  · how to take this  · when to take this        CONTINUE taking these medications    AIRBORNE GUMMY ORAL  Take by mouth every morning.     gabapentin 300 MG capsule  Commonly known as: NEURONTIN  Take 300 mg by mouth 3 (three) times daily.     multivitamin per tablet  Commonly known as: THERAGRAN  Take 1 tablet by mouth once daily.     ondansetron 4 MG Tbdl  Commonly known as: ZOFRAN-ODT  Take 1 tablet (4 mg total) by mouth every 6 (six) hours as needed.     PROAIR HFA 90 mcg/actuation inhaler  Generic drug: albuterol  Inhale 2 puffs into the lungs 3 (three) times daily as needed for Wheezing or Shortness of Breath.     tiZANidine 4 MG tablet  Commonly known as: ZANAFLEX  Take 4 mg by mouth every evening.        STOP taking these medications    metroNIDAZOLE 500 MG tablet  Commonly known as: FLAGYL     neomycin 500 mg Tab  Commonly known as: MYCIFRADIN            Trinidad Becerra MD  Colorectal Surgery  [facility] - Med Surg [facility])

## 2022-07-05 NOTE — PLAN OF CARE
07/05/22 0924   Final Note   Assessment Type Final Discharge Note   Anticipated Discharge Disposition Home   Post-Acute Status   Discharge Delays None known at this time     No needs.

## 2022-07-05 NOTE — PHYSICIAN QUERY
PT Name: Yaritza Shaw  MR #: [id number]     Documentation Clarification      CDS/: Dominique Aponte               Contact information: [email or web address]    This form is a permanent document in the medical record.     Query Date: July 5, 2022    By submitting this query, we are merely seeking further clarification of documentation. Please utilize your independent clinical judgment when addressing the question(s) below.    The Medical Record reflects the following:    Supporting Clinical Findings Location in Medical Record    POD 3 s/p robotic sigmoid colectomy      Patient passing flatus and having bowel function, persistent nausea and vomiting.   Ambulate, OOB      CRS 07/01   4 Days Post-Op  Nauseous and vomiting, tried breakfast and vomited it back up.  Has been able to tolerate minimal PO.  Reports distention, passing minimal flatus.    History of diverticulitis now POD 4 s/p robotic sigmoid colectomy, ileus persists.   Ileus persists, continues to have nausea vomiting   General Surgery PN 07/02   5 Days Post-Op  Tolerated pancakes and some liquids.  Nausea improving.  No flatus.  Ambulating the halls    There is distension    POD 5 s/p robotic sigmoid colectomy, ileus persists, awaiting return of bowel function.  Diet as tolerated.   General Surgery PN 07/03   Presented for robotic sigmoid colectomy for diverticular disease.  Post-operatively she was admitted to the floor.  She had return of bowel function, was able to tolerate a diet, and ambulate.  CRS Discharge Summary 07/05                                                                            Provider, please further specify above diagnosis of Ileus:     [   ] Paralytic/Adynamic ileus, complication of surgery    [   ] Paralytic/Adynamic ileus -- occurring in the post-operative period but not a complication of the surgery    [   ] Ileus, unspecified   [   ] Postoperative Ileus ruled out; Expected delay of return of bowel function    [    ] Other diagnosis or clarification, please specify: ____________   [ x ] Clinically undetermined

## 2022-07-05 NOTE — TELEPHONE ENCOUNTER
Called patient to schedule PO appointment with Dr. Becerra. No answer. Will schedule and follow up with patient to confirm date and time.

## 2022-07-05 NOTE — PT/OT/SLP PROGRESS
Physical Therapy      Patient Name:  Yaritza Shaw   MRN:  [id number]    Patient not seen today secondary to pt c/o swelling in LE's and L knee pain.Observed walking out of restroom, slight antalgic gt. Awaiting xray.  Will follow-up in PM if pt not d/c'd home.

## 2022-07-05 NOTE — TELEPHONE ENCOUNTER
"Spoke with patient regarding dieting PO and explained that she should continue regular diet with no restrictions. Pt asked, "should she continue diverticulitis precautions?" Advised to take the precautions advised by Dr. Becerra, otherwise continue without restrictions. Pt verbalized understanding.       ----- Message from Trinidad Becerra MD sent at 7/5/2022  3:50 PM CDT -----  Contact: @ [phone]  Regular diet is fine.     ----- Message -----  From: Jennifer Alcazar RN  Sent: 7/5/2022   3:39 PM CDT  To: Trinidad Becerra MD    Would you like her to be on a regular diet or something different?       ----- Message -----  From: Arnold Simeon  Sent: 7/5/2022   2:55 PM CDT  To: Mariam Abdi Staff    Patient calling to get clarity on the Discharge instructions relating to dieting, Please return call to discuss further             "

## 2022-07-05 NOTE — PLAN OF CARE
Problem: Adult Inpatient Plan of Care  Goal: Plan of Care Review  Outcome: Ongoing, Progressing  Goal: Readiness for Transition of Care  Outcome: Ongoing, Progressing     Problem: Infection  Goal: Absence of Infection Signs and Symptoms  Outcome: Ongoing, Progressing     Problem: Fall Injury Risk  Goal: Absence of Fall and Fall-Related Injury  Outcome: Ongoing, Progressing     Problem: Pain Acute  Goal: Acceptable Pain Control and Functional Ability  Outcome: Ongoing, Progressing     Problem: Nausea and Vomiting  Goal: Fluid and Electrolyte Balance  Outcome: Ongoing, Progressing

## 2022-07-06 ENCOUNTER — TELEPHONE (OUTPATIENT)
Dept: SURGERY | Facility: CLINIC | Age: 42
End: 2022-07-06
Payer: COMMERCIAL

## 2022-07-06 LAB
FINAL PATHOLOGIC DIAGNOSIS: NORMAL
GROSS: NORMAL
Lab: NORMAL

## 2022-07-06 NOTE — TELEPHONE ENCOUNTER
----- Message from Jennifer Ulloa sent at 7/6/2022  3:34 PM CDT -----  Regarding: CAll Back Request  Pt requesting call back  Still having swelling  [phone] (home)

## 2022-07-06 NOTE — TELEPHONE ENCOUNTER
Spoke with patient, reports lower extremity swelling for the past two days. PCP prescribed lasix and instructed to go to ED if no improvement by Friday.

## 2022-07-11 ENCOUNTER — TELEPHONE (OUTPATIENT)
Dept: SURGERY | Facility: OTHER | Age: 42
End: 2022-07-11
Payer: COMMERCIAL

## 2022-07-11 NOTE — TELEPHONE ENCOUNTER
Called Ms. Shaw to follow up after recent ED visit.  Labs without leukocytosis.  Imaging reassuring. Patient states that she is doing better.  Was able to tolerate a meal and bowel movements are more solid.  Patient will notify our office if fever > 101 or worsening symptoms.  Keep follow up for 7/18.     Trinidad Becerra MD  Staff Surgeon   Colon & Rectal Surgery

## 2022-07-15 ENCOUNTER — TELEPHONE (OUTPATIENT)
Dept: SURGERY | Facility: CLINIC | Age: 42
End: 2022-07-15
Payer: COMMERCIAL

## 2022-07-18 ENCOUNTER — TELEPHONE (OUTPATIENT)
Dept: SURGERY | Facility: CLINIC | Age: 42
End: 2022-07-18
Payer: COMMERCIAL

## 2022-07-18 ENCOUNTER — OFFICE VISIT (OUTPATIENT)
Dept: SURGERY | Facility: CLINIC | Age: 42
End: 2022-07-18
Payer: COMMERCIAL

## 2022-07-18 ENCOUNTER — PATIENT MESSAGE (OUTPATIENT)
Dept: SURGERY | Facility: CLINIC | Age: 42
End: 2022-07-18
Payer: COMMERCIAL

## 2022-07-18 VITALS
HEART RATE: 76 BPM | DIASTOLIC BLOOD PRESSURE: 71 MMHG | SYSTOLIC BLOOD PRESSURE: 131 MMHG | HEIGHT: 62 IN | WEIGHT: 168.44 LBS | BODY MASS INDEX: 31 KG/M2

## 2022-07-18 DIAGNOSIS — K57.32 DIVERTICULITIS OF LARGE INTESTINE WITHOUT PERFORATION OR ABSCESS WITHOUT BLEEDING: Primary | ICD-10-CM

## 2022-07-18 PROCEDURE — 99024 POSTOP FOLLOW-UP VISIT: CPT | Mod: S$GLB,,, | Performed by: SURGERY

## 2022-07-18 PROCEDURE — 1159F PR MEDICATION LIST DOCUMENTED IN MEDICAL RECORD: ICD-10-PCS | Mod: CPTII,S$GLB,, | Performed by: SURGERY

## 2022-07-18 PROCEDURE — 99999 PR PBB SHADOW E&M-EST. PATIENT-LVL III: ICD-10-PCS | Mod: PBBFAC,,, | Performed by: SURGERY

## 2022-07-18 PROCEDURE — 3008F PR BODY MASS INDEX (BMI) DOCUMENTED: ICD-10-PCS | Mod: CPTII,S$GLB,, | Performed by: SURGERY

## 2022-07-18 PROCEDURE — 99999 PR PBB SHADOW E&M-EST. PATIENT-LVL III: CPT | Mod: PBBFAC,,, | Performed by: SURGERY

## 2022-07-18 PROCEDURE — 3078F PR MOST RECENT DIASTOLIC BLOOD PRESSURE < 80 MM HG: ICD-10-PCS | Mod: CPTII,S$GLB,, | Performed by: SURGERY

## 2022-07-18 PROCEDURE — 1159F MED LIST DOCD IN RCRD: CPT | Mod: CPTII,S$GLB,, | Performed by: SURGERY

## 2022-07-18 PROCEDURE — 3008F BODY MASS INDEX DOCD: CPT | Mod: CPTII,S$GLB,, | Performed by: SURGERY

## 2022-07-18 PROCEDURE — 3075F SYST BP GE 130 - 139MM HG: CPT | Mod: CPTII,S$GLB,, | Performed by: SURGERY

## 2022-07-18 PROCEDURE — 3078F DIAST BP <80 MM HG: CPT | Mod: CPTII,S$GLB,, | Performed by: SURGERY

## 2022-07-18 PROCEDURE — 3075F PR MOST RECENT SYSTOLIC BLOOD PRESS GE 130-139MM HG: ICD-10-PCS | Mod: CPTII,S$GLB,, | Performed by: SURGERY

## 2022-07-18 PROCEDURE — 99024 PR POST-OP FOLLOW-UP VISIT: ICD-10-PCS | Mod: S$GLB,,, | Performed by: SURGERY

## 2022-07-18 NOTE — TELEPHONE ENCOUNTER
"Spoke with patient regarding appointment today initially scheduled for 11 AM. Pt stated, "she had an appointment letter that stated she was scheduled at the [facility] for 1:20 PM. She came to [place] where she was told her appointment was at [facility]. She left and went to [facility] where she was told her appointment time had passed and Mariam's office is no longer in house." Pt access rep stating, "pt had recently changed her number and it was being updated in the system and should be able to contact." No answers to calls by CRS office due to incorrect number being on file. Contacted patient with new number and offered her to be seen at [facility] as soon as she can get here. Pt verbally confirmed location and is en route.  "

## 2022-07-18 NOTE — TELEPHONE ENCOUNTER
Called x2 to speak with patient regarding appointment today. Pt was scheduled for 11 AM. Pt does not have a voicemail and did not answer.     ----- Message from Kate Alexander sent at 7/18/2022 11:24 AM CDT -----  Regarding: [facility]ST Appointment  Contact: [phone]  Calling to get appointment rescheduled today to 1pm per discharge papers. Please call to confirm.

## 2022-07-18 NOTE — PROGRESS NOTES
Colon & Rectal Surgery Clinic Follow Up    HPI:   Yaritza Shaw is a 42 y.o. female who presents for follow up after robotic sigmoid colectomy.  Nausea resolving.  Having more consistent bowel movements.  Knee pain resolved.  Had an ED visit with dehiscence of LUQ incision, no further issues. No fevers.       Objective:   Vitals:    07/18/22 1512   BP: 131/71   Pulse: 76        Physical Exam   Gen: well developed female, NAD  HEENT: normocephalic, atraumatic, PERRL, EOMI   CV: RRR, no murmurs  Resp: nonlabored, CTAB   Abd: soft, LUQ incision with superficial dehiscence, no erythema or drainage, remaining incisions well approximated  MSK: no gross deformities    Assessment and Plan:   Yaritza Shaw  is a 42 y.o. female who presents for follow up after robotic sigmoid colectomy    - pathology benign   - patient recovering well  - Follow up HONEY Becerra MD  Staff Surgeon   Colon & Rectal Surgery

## 2022-07-19 ENCOUNTER — TELEPHONE (OUTPATIENT)
Dept: SURGERY | Facility: CLINIC | Age: 42
End: 2022-07-19
Payer: COMMERCIAL

## 2022-07-19 NOTE — TELEPHONE ENCOUNTER
"Spoke with patient regarding oxycodone prescribed post op. Pt states, "she becomes nauseated when taking opioid medication and would like to know if there is something less strong to take." Pt also [facility] information on code [facility]. Would inquire into code for portal and contact patient. Will reach out to Dr. Becerra regarding pain management.     ----- Message from Arthur Doyle sent at 7/19/2022  2:43 PM CDT -----  Who called?:PT      What is the request in detail:PT would like to speak with staff about receiving a new prescription. Please advise         Can the clinic reply by [facility]?:No        Best Call Back Number:[phone]      "

## 2022-07-19 NOTE — TELEPHONE ENCOUNTER
Spoke with patient to verify that access to [facility] was established. Pt confirmed ability to log into portal. Informed pt, per Dr. Becerra, to use tylenol/ibuprofen for pain management or a half a tablet of oxycodone for breakthrough pain with food and ondansetron or phenergan for nausea.  Pt verbalized understanding and denied further questions.

## 2022-12-20 PROBLEM — K64.8 OTHER HEMORRHOIDS: Status: ACTIVE | Noted: 2022-12-20

## (undated) DEVICE — STAPLER ECHELON PWR CIR 29MM

## (undated) DEVICE — DRAPE ARM DAVINCI XI

## (undated) DEVICE — SUT PDS II 0 CT VIL MONO 36

## (undated) DEVICE — TOWEL OR DISP STRL BLUE 4/PK

## (undated) DEVICE — GRASPER EPIX 5X20MM 45CM

## (undated) DEVICE — SUT 3-0 VICRYL / SH (J416)

## (undated) DEVICE — Device

## (undated) DEVICE — KIT WING PAD POSITIONING

## (undated) DEVICE — OBTURATOR BLADELESS 8MM XI

## (undated) DEVICE — SEALER VESSEL EXTEND

## (undated) DEVICE — ADHESIVE DERMABOND ADVANCED

## (undated) DEVICE — RELOAD SUREFORM 45 4.3 GRN 6R

## (undated) DEVICE — SUT 3-0 VICRYL SH CR/8 18

## (undated) DEVICE — TIP YANKAUERS BULB NO VENT

## (undated) DEVICE — SOL CLEARIFY VISUALIZATION LAP

## (undated) DEVICE — SCRUB 10% POVIDONE IODINE 4OZ

## (undated) DEVICE — SUT PROLENE 2-0 SH 36IN BLU

## (undated) DEVICE — LUBRICANT SURGILUBE 2 OZ

## (undated) DEVICE — CLIPPER BLADE MOD 4406 (CAREF)

## (undated) DEVICE — COVER TIP CURVED SCISSORS XI

## (undated) DEVICE — TUBING SUC UNIV W/CONN 12FT

## (undated) DEVICE — SUT 2-0 VICRYL / SH (J417)

## (undated) DEVICE — SUT PDS II 1 CT VIL MONO 36

## (undated) DEVICE — SOL ELECTROLUBE ANTI-STIC

## (undated) DEVICE — SUT 0 54IN COATED VICRYL U

## (undated) DEVICE — STAPLER SUREFORM SGL USE 45

## (undated) DEVICE — SUT MCRYL PLUS 4-0 PS2 27IN

## (undated) DEVICE — SUT V-LOC 180 ABD 2/0 GS-21

## (undated) DEVICE — ELECTRODE REM PLYHSV RETURN 9

## (undated) DEVICE — RELOAD SUREFORM 60 3.5 BLU 6R

## (undated) DEVICE — CANNULA REDUCER 12-8MM

## (undated) DEVICE — CANNULA SEAL 12MM

## (undated) DEVICE — NDL INSUFFLATION VERRES 120MM

## (undated) DEVICE — DRAPE COLUMN DAVINCI XI

## (undated) DEVICE — RELOAD SUREFORM 45 3.5 BLU 6R

## (undated) DEVICE — PACK ROBOTIC

## (undated) DEVICE — SEE MEDLINE ITEM 146420

## (undated) DEVICE — SYR IRRIGATION BULB STER 60ML

## (undated) DEVICE — SET TRI-LUMEN FILTERED TUBE

## (undated) DEVICE — SEAL UNIVERSAL 5MM-8MM XI